# Patient Record
Sex: FEMALE | Race: WHITE | Employment: OTHER | URBAN - METROPOLITAN AREA
[De-identification: names, ages, dates, MRNs, and addresses within clinical notes are randomized per-mention and may not be internally consistent; named-entity substitution may affect disease eponyms.]

---

## 2020-07-24 ENCOUNTER — HOSPITAL ENCOUNTER (OUTPATIENT)
Dept: ULTRASOUND IMAGING | Age: 85
Discharge: HOME | End: 2020-07-24
Attending: ORTHOPAEDIC SURGERY
Payer: MEDICARE

## 2020-07-24 DIAGNOSIS — M79.662 PAIN AND SWELLING OF LEFT LOWER LEG: ICD-10-CM

## 2020-07-24 DIAGNOSIS — M79.89 PAIN AND SWELLING OF LEFT LOWER LEG: ICD-10-CM

## 2020-07-24 PROCEDURE — 93971 EXTREMITY STUDY: CPT

## 2022-09-19 ENCOUNTER — HOSPITAL ENCOUNTER (OUTPATIENT)
Dept: GENERAL RADIOLOGY | Age: 87
Discharge: HOME | End: 2022-09-19
Payer: MEDICARE

## 2022-09-19 ENCOUNTER — TRANSCRIBE ORDER (OUTPATIENT)
Dept: GENERAL RADIOLOGY | Age: 87
End: 2022-09-19
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
## 2022-09-19 DIAGNOSIS — R07.89 CHEST WALL PAIN: ICD-10-CM
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
## 2022-09-19 PROCEDURE — 71111 X-RAY EXAM RIBS/CHEST4/> VWS: CPT

## 2022-11-01 ENCOUNTER — OFFICE VISIT (OUTPATIENT)
Dept: ENT CLINIC | Age: 87
End: 2022-11-01
Payer: MEDICARE

## 2022-11-01 DIAGNOSIS — H90.3 SENSORINEURAL HEARING LOSS (SNHL) OF BOTH EARS: Primary | ICD-10-CM

## 2022-11-01 PROCEDURE — 92557 COMPREHENSIVE HEARING TEST: CPT | Performed by: AUDIOLOGIST

## 2022-11-01 PROCEDURE — 92567 TYMPANOMETRY: CPT | Performed by: AUDIOLOGIST

## 2022-11-01 NOTE — LETTER
11/1/2022    Patient: Avel Mora   YOB: 1928   Date of Visit: 11/1/2022     Meredith Collins MD  [address] [place] [address]  Via Fax: [phone]    Dear Meredith Collins MD,      Thank you for referring Ms. Viji Hein to [facility]SE [facility] [address], THROAT AND ALLERGY CARE for evaluation. My notes for this consultation are attached. If you have questions, please do not hesitate to call me. I look forward to following your patient along with you.       Sincerely,    Tennille Perea, AuD

## 2022-11-01 NOTE — PROGRESS NOTES
Patient name: Nikos Cates   : 1928   MRN: [id number]   Appointment type: Audiogram    Patient is a very pleasant 80 y.o. female  referred by Dr. Beau Holt for an audiological evaluation. Patient reports hearing loss and tinnitus. There is no complaint of dizziness or vertigo. There is no family history of hearing loss. Patient has no reported history of noise exposure or head trauma and denies any ear pain or ear fullness/pressure. She is accompanied to today's appointment by her daughter. Per daughter, patient's last audiogram is dated 2019 through [address] [place] [address] (not available today). She reports that patient was fit with hearing aids but wore them into the shower and damaged them. She also reports a diagnosis of dementia. Otoscopy: canals clear, tympanic membrane intact bilaterally  Tympanometry:   RE: Type C and negative pressure    LE: Type C and negative pressure    SRT:   RE Speech Reception Threshold (SRT) was obtained at 55 dBHL   LE Speech Reception Threshold (SRT) was obtained at 60 dBHL    WRS (NU-6):   RE Poorin quiet when words were presented at 85 dBHL. LE Poorin quiet when words were presented at 85 dBHL. Pure tone audiometry:   RE: Moderate sloping to severe sensorineural hearing loss    LE: Moderate-severe sloping to severe sensorineural hearing loss    Explained results to patient and daughter. Patient's hearing loss is significant and patient would be a candidate for either hearing aids or referral for cochlear implant evaluation. Did discuss both options and daughter is hesitant to pursue CI evaluation due to risks of anesthesia; patient's age, etc. Also discussed that dementia diagnosis may present some challenges if she were to pursue CI evaluation. Did also discuss realistic expectations for hearing aids given patient's hearing loss and word understanding; patient and daughter would like to proceed with hearing aid evaluation.      Plan:   Hearing aid evaluation upon request.  Repeat audiogram upon request.    Rebecca Leo   Doctor of Audiology

## 2022-11-22 ENCOUNTER — OFFICE VISIT (OUTPATIENT)
Dept: ENT CLINIC | Age: 87
End: 2022-11-22
Payer: MEDICARE

## 2022-11-22 DIAGNOSIS — H90.3 SENSORINEURAL HEARING LOSS (SNHL) OF BOTH EARS: Primary | ICD-10-CM

## 2022-11-22 PROCEDURE — V5010 ASSESSMENT FOR HEARING AID: HCPCS | Performed by: AUDIOLOGIST

## 2022-11-22 PROCEDURE — V5275 EAR IMPRESSION: HCPCS | Performed by: AUDIOLOGIST

## 2022-11-22 NOTE — PROGRESS NOTES
Pt. Name: Shy Bazzi   : 1928  MRN: [id number]    Appointment type: Hearing Aid Evaluation  Patient is a very pleasant 80y.o. year old female seen today for a hearing aid evaluation. She is accompanied to today's appointment by her daughter. Patient's last hearing test was 2022 which shows a moderate to severe sensorineural hearing loss in the right ear and a moderate-severe to severe sensorineural hearing loss in the left ear. Patient reports difficulty understanding speech, especially in the presence of background noise. Patient's medical history is significant for dementia. Reviewed audiogram with patient and discussed hearing aid candidacy. Went over hearing aid style, technology and cost. Recommended trial with hearing aids. Patient agreed. Will order [employer] Moment 110 BTE-R hearing aids for patient. After informed verbal consent was obtained, bilateral earmold impressions were taken today without incident. Will send earmold impressions to [facility] as [employer] does not make earmolds to accommodate traditional tubing. [facility] can be found under Media. Plan: Patient to RTC for HAF.        Rebecca Nguyen  Doctor of Audiology

## 2022-12-23 ENCOUNTER — TELEPHONE (OUTPATIENT)
Dept: ENT CLINIC | Age: 87
End: 2022-12-23

## 2022-12-27 ENCOUNTER — OFFICE VISIT (OUTPATIENT)
Dept: ENT CLINIC | Age: 87
End: 2022-12-27
Payer: MEDICARE

## 2022-12-27 DIAGNOSIS — H90.3 SENSORINEURAL HEARING LOSS (SNHL) OF BOTH EARS: Primary | ICD-10-CM

## 2022-12-27 PROCEDURE — V5299 HEARING SERVICE: HCPCS | Performed by: AUDIOLOGIST

## 2022-12-27 NOTE — PROGRESS NOTES
Pt. Name: Abhay Fortune   : 1928   MRN: [id number]     Appointment type: Hearing Aid Fitting  Patient was seen today for a binaural hearing aid fitting. Had previously ordered [employer] Moment 110 BTE-R hearing aids coupled to custom skeleton acrylic earmolds. Reviewed care and use of hearing aids and  today with patient and daughter; however patient had significant difficulty with removing hearing aids. Recommended re-ordering earmolds with removal string handle to facilitate easier removal. Patient and daughter agreed. Will order new earmolds and return current molds for credit when they arrive. Will keep all hearing aid components in office until that time. No payment collected today. Recommended we schedule a hearing aid fitting in 2 weeks and plan to spend majority of that appointment practicing insertion/removal of hearing aids. Plan: Pt. to RTC in 2 weeks to be fit with hearing aids with removal handles on earmolds.      Rebecca Lozada   Doctor of Audiology

## 2023-01-10 ENCOUNTER — OFFICE VISIT (OUTPATIENT)
Dept: ENT CLINIC | Age: 88
End: 2023-01-10
Payer: MEDICARE

## 2023-01-10 DIAGNOSIS — H90.3 SENSORINEURAL HEARING LOSS (SNHL) OF BOTH EARS: Primary | ICD-10-CM

## 2023-01-10 PROCEDURE — V5264 EAR MOLD/INSERT: HCPCS | Performed by: AUDIOLOGIST

## 2023-01-10 PROCEDURE — V5261 HEARING AID, DIGIT, BIN, BTE: HCPCS | Performed by: AUDIOLOGIST

## 2023-01-10 NOTE — PROGRESS NOTES
Pt. Name: Fani Edmond   : 1928   MRN: [id number]     Appointment type: Hearing Aid Fitting  Patient was seen today for a binaural hearing aid fitting and was fit with [employer] Moment 110 BTE-R hearing aids coupled to custom acrylic skeleton earmolds. Patient was initially seen for hearing aid fitting on 2022; however absence of removal handle on earmolds made insertion/removal significantly more difficult for patient. Offered to have earmolds remade with removal handles and have hearing aid fitting scheduled when new molds came in; patient and daughter agreed. Hearing aids are a good fit. Programmed to adaptation level 2. Patient did report that she was hearing much louder than before; reviewed that there would likely need to be an adjustment period with listening with the hearing aids and re-learning how to process sound; daughter and patient indicated understanding. Hearing aids have one automatic program and an automatic telephone program. Patient was instructed on use and care of hearing aids as well as given a hearing aid instruction manual. Patient practiced placing hearing aids on  today. R and L indicator labels were added to  wells to help in differentiating hearing aids; also reviewed color coding. Patient was able to remove and insert hearing aids successfully today in office. Did osito \"handle\" of skeleton mold to help patient orient and hold the mold correctly for insertion. Advised using mirror once earmold was partially inserted in order to check that all parts of earmold were settled correctly in ear. Realistic expectations of hearing aid performance were discussed. Hearing aid warranty was explained. Patient demonstrates understanding of what was discussed during today's appointment. Reviewed information on Hearing Aid Purchase Agreement with patient. Patient signed agreement form and paid half of total for hearing aids.  Hearing Aid Purchase Agreement can be found under Media. Hearing aid information:   : [employer]  Model: Moment 110 BTE-R  S/N R: [id number]  S/N L: [id number]  Color: Sammy Lofton  Battery size: rechargeable ( S/N: [id number])  Earmold S/N R: [id number]  Earmold S/N L: [id number] (repair warranty: 4-3-2023)  Warranty end: 12-    End of Trial: 2-  Amount Due: $720.00    Plan: Pt. to RTC in 2 weeks for HAC.     Rebecca Graham   Doctor of Audiology

## 2023-02-02 ENCOUNTER — OFFICE VISIT (OUTPATIENT)
Dept: ENT CLINIC | Age: 88
End: 2023-02-02
Payer: MEDICARE

## 2023-02-02 DIAGNOSIS — H90.3 SENSORINEURAL HEARING LOSS (SNHL) OF BOTH EARS: Primary | ICD-10-CM

## 2023-02-02 PROCEDURE — V5299 HEARING SERVICE: HCPCS | Performed by: AUDIOLOGIST

## 2023-02-02 NOTE — PROGRESS NOTES
Pt. Name: Mark Nuñez   : 1928   MRN: [id number]     Appointment type: Hearing Aid Check    : [employer]   Model: Moment 110-R  S/N R: [id number]   S/N L: [id number]  Repair warranty: 2024  L/D warranty: 2024    Earmold information Ros Amor)  S/N R: [id number]   S/N L: [id number]    Patient in for a progress check with [employer] Moment 110 BTE-R hearing aids coupled to custom skeleton acrylic earmolds. Patient was initially fit with these devices on 1- after earmold remake to include removal handle for easier use. Patient is accompanied to today's appointment by her daughter, who reports that patient is hearing better when the hearing aids are in, and she is successfully removing them and using the . However she is still having trouble inserting hearing aids correctly. Daughter reports that usually she is responsible for putting them in every morning as patient is not able. Patient wonders whether a different style may be easier to use. Data logging revealed average daily wear time of 8 hours 8 minutes. Increased to acclimatization level 3 and patient reported hearing better. Did discuss option to swap to a full-shell ITE hearing aid as patient is still within her trial period; patient is interested in this option. Did state that I'm not able to guarantee better fit or easier insertion with a full-shell custom aid but willing to try; patient and daughter indicated understanding. Will order full-shell custom ITE-R aids from [employer] using earmold impressions on file. Patient is scheduled for hearing aid fitting with new tech on 2023. At that time, will plan to swap patient's current BTE aids but keep them in the office - can discuss extending [address] period with [employer] if needed. Plan: Patient to [facility] 2023 for hearing aid fitting with [employer] custom ITE aids.      Rebecca Rondon   Doctor of Audiology

## 2023-02-14 ENCOUNTER — OFFICE VISIT (OUTPATIENT)
Dept: ENT CLINIC | Age: 88
End: 2023-02-14
Payer: MEDICARE

## 2023-02-14 DIAGNOSIS — H90.3 SENSORINEURAL HEARING LOSS (SNHL) OF BOTH EARS: Primary | ICD-10-CM

## 2023-02-14 PROCEDURE — V5299 HEARING SERVICE: HCPCS | Performed by: AUDIOLOGIST

## 2023-02-14 NOTE — PROGRESS NOTES
Pt. Name: Gaby Burgess   : 1928   MRN: [id number]     Appointment type: Hearing Aid Fitting (Trial Period - Exchange)  Patient was seen today for a binaural hearing aid fitting and was fit with [employer] AI 1000 full-shell ITE-R hearing aids. Patient had previously been fit on 1- with [employer] Moment 110 BTE-R hearing aids coupled to custom acrylic earmolds but had reported that they were difficult to insert; custom devices were recommended as an exchange during the trial period. Patient agreed. Hearing aids are a good fit. Programmed to adaptation level 2. Hearing aids have one automatic program and an automatic telephone program. Demonstrated indicator tones (i.e. low battery, phone). Patient was instructed on use and care of hearing aids as well as given a hearing aid instruction manual. Patient practiced using  and inserting/removing hearing aids successfully. Patient reports that the left hearing aid is painful to insert; after discussion with [facility] will try changing left aid style only to a canal-style custom device to avoid putting further pressure on the alejandro. Explained to patient and daughter that I will request a remake of the left device so the patient will only be leaving today with a hearing aid for the right ear. Realistic expectations of hearing aid performance were discussed. Hearing aid warranty was explained. Patient demonstrates understanding of what was discussed during today's appointment. Reviewed information on Hearing Aid Purchase Agreement with patient. Hearing Aid Purchase Agreement can be found under Media.     Hearing aid information:   : [employer]  Model: [employer] AI 1000 ITE-R (full shell)  S/N R: [id number]  S/N L: [id number] - sending back for remake to half-shell/ITC style  Color: pink faceplate/color-coded canals  Battery size: rechargeable ( S/N: [id number])  Warranty end: 3-    End of Trial: 3-  Amount Due: $720.00    Plan: Pt. to RTC in 2 weeks for [facility] pascale left hearing aid.      Verta Kayser, AuD   Doctor of Audiology

## 2023-03-08 ENCOUNTER — OFFICE VISIT (OUTPATIENT)
Dept: ENT CLINIC | Age: 88
End: 2023-03-08
Payer: MEDICARE

## 2023-03-08 DIAGNOSIS — H90.3 SENSORINEURAL HEARING LOSS (SNHL) OF BOTH EARS: Primary | ICD-10-CM

## 2023-03-08 PROCEDURE — V5299 HEARING SERVICE: HCPCS | Performed by: AUDIOLOGIST

## 2023-03-08 NOTE — PROGRESS NOTES
Pt. Name: Mauricio Fortune   : 1928   MRN: [id number]     Appointment type: Hearing Aid Check (Trial Period)    : Audie Garcia: Thier[employer] AI 1000 ITE-R  S/N R: [id number]   S/N L: [id number]  Repair warranty: 3-  L/D warranty: 3-    Patient in for a progress check with [employer] AI 1000 ITE-R hearing aids. Patient was initially fit on 2023. This was an exchange from her previous pair of [employer] Trovali BTE-R hearing aids coupled to custom molds (fit on 1-). Patient had reported difficulty inserting and positioning hearing aids; exchange with custom in-the-ear aids was recommended. At previous appointment for fitting with custom aids, patient had discomfort and difficulty with placing left hearing aid due to size and shape of aid. Left aid was sent in for remake from ITE to half-shell style, to avoid creating a pressure sore and to facilitate easier insertion. Patient reports she is very pleased with the right hearing aid and has had no issues with positioning it in her ear, or using the . Patient's daughter also reports that she is doing well with her right aid. Data logging revealed average daily wear time of 8 hours for the right hearing aid. Connected remade left hearing aid in software and paired hearing aids together. User button is disabled. Patient reported good sound quality in both ears and a comfortable fit in the left ear. Patient was able to successfully insert and remove left hearing aid several times today, and place aid correctly on the . Plan: Patient to RTC in 2 weeks for hearing aid check if needed. If patient is doing well and there are no concerns, can plan to push hearing aid check out to 3 months and return [employer] BTEs for credit.     End of Trial: 3-  Amount Due: $720.00    Rebecca Dietrich   Doctor of Audiology

## 2023-03-21 ENCOUNTER — TELEPHONE (OUTPATIENT)
Dept: ENT CLINIC | Age: 88
End: 2023-03-21

## 2023-03-21 NOTE — TELEPHONE ENCOUNTER
I spoke with pt's daughter and I rescheduled her [address] from tomorrow to 3 months from now and I informed her of the $720.00 due at the next appointment and she stated she was not sure that the amount was still owed because she stated that the pt paid $720.00 on 01/10 and then paid $670.00 on 02/17

## 2023-04-05 ENCOUNTER — HOSPITAL ENCOUNTER (OUTPATIENT)
Dept: GENERAL RADIOLOGY | Age: 88
End: 2023-04-05
Payer: MEDICARE

## 2023-04-05 ENCOUNTER — TRANSCRIBE ORDER (OUTPATIENT)
Dept: GENERAL RADIOLOGY | Age: 88
End: 2023-04-05

## 2023-04-05 PROCEDURE — 71046 X-RAY EXAM CHEST 2 VIEWS: CPT

## 2023-04-13 ENCOUNTER — HOSPITAL ENCOUNTER (OUTPATIENT)
Dept: LAB | Age: 88
Discharge: HOME | End: 2023-04-13
Payer: MEDICARE

## 2023-04-13 DIAGNOSIS — J44.9 COPD WITH PNEUMONIA (HCC): ICD-10-CM

## 2023-04-13 DIAGNOSIS — J18.9 COPD WITH PNEUMONIA (HCC): ICD-10-CM

## 2023-04-13 PROCEDURE — 86480 TB TEST CELL IMMUN MEASURE: CPT

## 2023-04-13 PROCEDURE — 36415 COLL VENOUS BLD VENIPUNCTURE: CPT

## 2023-04-19 ENCOUNTER — HOSPITAL ENCOUNTER (OUTPATIENT)
Dept: CT IMAGING | Age: 88
Discharge: HOME | End: 2023-04-19
Payer: MEDICARE

## 2023-04-19 DIAGNOSIS — J47.9 BRONCHIECTASIS (HCC): ICD-10-CM

## 2023-04-19 PROCEDURE — 71250 CT THORAX DX C-: CPT

## 2023-04-27 ENCOUNTER — HOSPITAL ENCOUNTER (OUTPATIENT)
Dept: LAB | Age: 88
Discharge: HOME | End: 2023-04-27
Payer: MEDICARE

## 2023-04-27 ENCOUNTER — TRANSCRIBE ORDER (OUTPATIENT)
Dept: REGISTRATION | Age: 88
End: 2023-04-27

## 2023-04-27 DIAGNOSIS — J44.9 COPD (CHRONIC OBSTRUCTIVE PULMONARY DISEASE) (HCC): Primary | ICD-10-CM

## 2023-04-27 DIAGNOSIS — J44.9 COPD (CHRONIC OBSTRUCTIVE PULMONARY DISEASE) (HCC): ICD-10-CM

## 2023-04-27 PROCEDURE — 36415 COLL VENOUS BLD VENIPUNCTURE: CPT

## 2023-04-27 PROCEDURE — 86480 TB TEST CELL IMMUN MEASURE: CPT

## 2023-04-28 ENCOUNTER — HOSPITAL ENCOUNTER (EMERGENCY)
Age: 88
Discharge: HOME | End: 2023-04-28
Attending: EMERGENCY MEDICINE
Payer: MEDICARE

## 2023-04-28 ENCOUNTER — APPOINTMENT (OUTPATIENT)
Dept: CT IMAGING | Age: 88
End: 2023-04-28
Attending: EMERGENCY MEDICINE
Payer: MEDICARE

## 2023-04-28 VITALS
DIASTOLIC BLOOD PRESSURE: 78 MMHG | SYSTOLIC BLOOD PRESSURE: 173 MMHG | OXYGEN SATURATION: 96 % | HEART RATE: 68 BPM | RESPIRATION RATE: 18 BRPM | TEMPERATURE: 98 F
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
## 2023-04-28 DIAGNOSIS — S00.83XA TRAUMATIC HEMATOMA OF FOREHEAD, INITIAL ENCOUNTER: ICD-10-CM

## 2023-04-28 DIAGNOSIS — S22.060A CLOSED WEDGE COMPRESSION FRACTURE OF T8 VERTEBRA, INITIAL ENCOUNTER (HCC): Primary | ICD-10-CM

## 2023-04-28 PROCEDURE — 71250 CT THORAX DX C-: CPT

## 2023-04-28 PROCEDURE — 70450 CT HEAD/BRAIN W/O DYE: CPT

## 2023-04-28 PROCEDURE — 72125 CT NECK SPINE W/O DYE: CPT

## 2023-04-28 PROCEDURE — 99284 EMERGENCY DEPT VISIT MOD MDM: CPT

## 2023-04-28 NOTE — ED PROVIDER NOTES
Fall       89y F here s/p fall. Occurred about 3 hours prior to my evaluation today. Was home alone but when daughter and son in law got home pt was on the floor. She does have some dementia but was able to tell them she tripped on her walker and did not have loss of consciousness. She does have a hematoma to the forehead. Also complains of some R sided rib pain and thoracic back pain. No vomiting. Has been acting normally since that time.     Past Medical History:   Diagnosis Date    Hypercholesterolemia     Hypertension     Macular degeneration        Past Surgical History:   Procedure Laterality Date    CARDIAC SURG PROCEDURE UNLIST      cabg 2006    HX CHOLECYSTECTOMY  1995    HX HAMMER TOE REPAIR  2000    [address]    HX ORTHOPAEDIC  1999    plate/7 screws in collar bone    HX OTHER SURGICAL  1995 & 1996    heel spur         Family History:   Problem Relation Age of Onset    Heart Disease Sister     Heart Disease Son     Cancer Son     Cancer Daughter        Social History     Socioeconomic History    Marital status:      Spouse name: Not on file    Number of children: Not on file    Years of education: Not on file    Highest education level: Not on file   Occupational History    Not on file   Tobacco Use    Smoking status: Former     Years: 22.00     Types: Cigarettes    Smokeless tobacco: Never   Substance and Sexual Activity    Alcohol use: No    Drug use: Not on file    Sexual activity: Not on file   Other Topics Concern    Not on file   Social History Narrative    Not on file     Social Determinants of Health     Financial Resource Strain: Not on file   Food Insecurity: Not on file   Transportation Needs: Not on file   Physical Activity: Not on file   Stress: Not on file   Social Connections: Not on file   Intimate Partner Violence: Not on file   Housing Stability: Not on file         ALLERGIES: Bactrim [sulfamethoprim ds], Flagyl [metronidazole], Keftab, Macrodantin [nitrofurantoin macrocrystalline], Meclizine, Noroxin [norfloxacin], Trimethoprim, and Ultram [tramadol]    Review of Systems  Review of Systems   Constitutional: (-) weight loss. HEENT: (-) stiff neck   Eyes: (-) discharge. Respiratory: (-) cough. Cardiovascular: (-) syncope. Gastrointestinal: (-) blood in stool. Genitourinary: (-) hematuria. Musculoskeletal: (-) myalgias. Neurological: (-) seizure. Skin: (-) petechiae  Lymph/Immunologic: (-) enlarged lymph nodes  All other systems reviewed and are negative. Vitals:    04/28/23 1524   BP: 126/85   Pulse: 75   Resp: 18   Temp: 98.1 °F (36.7 °C)   SpO2: 94%            Physical Exam   Nursing note and vitals reviewed. Constitutional: oriented to person, place, and time. appears well-developed and well-nourished. No distress. Head: Normocephalic. Sclera anicteric. 2cm hematoma to the forehead. Nose: No rhinorrhea  Mouth/Throat: Oropharynx is clear and moist. Pharynx normal  Eyes: Conjunctivae are normal. Pupils are equal, round, and reactive to light. Right eye exhibits no discharge. Left eye exhibits no discharge. Neck: Painless normal range of motion. Neck supple. No LAD. Cardiovascular: Normal rate, regular rhythm, normal heart sounds and intact distal pulses. Exam reveals no gallop and no friction rub. No murmur heard. Pulmonary/Chest:  No respiratory distress. No wheezes. No rales. No rhonchi. No increased work of breathing. No accessory muscle use. Good air exchange throughout. Tender around the costal margin of the R lower ribs. No crepitance. Abdominal: soft, non-tender, no rebound or guarding. No hepatosplenomegaly. Normal bowel sounds throughout. Back: no tenderness to palpation, no deformities, no CVA tenderness  Extremities/Musculoskeletal: Normal range of motion. no tenderness. No edema. Distal extremities are neurovasc intact. Lymphadenopathy:   No adenopathy. Neurological:  Alert and oriented to person, place, and time.  Coordination normal. CN 2-12 intact. Motor and sensory function intact. Skin: Skin is warm and dry. No rash noted. No pallor. Medical Decision Making  Amount and/or Complexity of Data Reviewed  Radiology: ordered. 80y F here s/p fall. Will check CT head and c-spine as well as CXR and thoracic spine imaging. Procedures    4:54 PM  Compression fx at T8. Pain will controlled and has been ambulatory. Discussed with ortho - will dc and have her follow-up next week to determine further treatment plan. Daughter is comfortable with the plan.

## 2023-04-28 NOTE — DISCHARGE INSTRUCTIONS
Use tylenol for pain. Avoid heavy lifting, bending, and twisting. Take extra care when going from a seated to standing position and vice versa. Please call orthopaedics to schedule an appointment for next week.

## 2023-04-28 NOTE — ED TRIAGE NOTES
Patient arrived with daughter. Patient lives with daughter. Patient had unwitnessed mechanical GLF and was on floor for at most of 45 minutes while daughter was running errands. Patient denies LOC. Patient hit her head - has a large bruise above right eye. Denies blood thinners.  Patient c/o right elbow pain and right sided rib pain

## 2023-05-02 LAB
M TB IFN-G BLD-IMP: NEGATIVE
M TB IFN-G CD4+ T-CELLS BLD-ACNC: 0.05 IU/ML
M TBIFN-G CD4+ CD8+T-CELLS BLD-ACNC: 0.06 IU/ML
QUANTIFERON CRITERIA, QFI1T: NORMAL
QUANTIFERON INCUBATION, QF1T: NORMAL
QUANTIFERON MITOGEN VALUE: >10 IU/ML
QUANTIFERON NIL VALUE: 0.06 IU/ML

## 2023-05-11 ENCOUNTER — APPOINTMENT (OUTPATIENT)
Facility: HOSPITAL | Age: 88
End: 2023-05-11
Payer: MEDICARE

## 2023-05-11 ENCOUNTER — HOSPITAL ENCOUNTER (EMERGENCY)
Facility: HOSPITAL | Age: 88
Discharge: HOME | End: 2023-05-11
Attending: STUDENT IN AN ORGANIZED HEALTH CARE EDUCATION/TRAINING PROGRAM
Payer: MEDICARE

## 2023-05-11 VITALS
RESPIRATION RATE: 16 BRPM | OXYGEN SATURATION: 97 % | DIASTOLIC BLOOD PRESSURE: 117 MMHG | TEMPERATURE: 97.8 F | BODY MASS INDEX: 25.98 KG/M2 | SYSTOLIC BLOOD PRESSURE: 133 MMHG | HEART RATE: 80 BPM | HEIGHT: 62 IN | WEIGHT: 141.2 LBS

## 2023-05-11 DIAGNOSIS — S22.060D CLOSED WEDGE COMPRESSION FRACTURE OF T8 VERTEBRA WITH ROUTINE HEALING, SUBSEQUENT ENCOUNTER: ICD-10-CM

## 2023-05-11 DIAGNOSIS — S20.211A CONTUSION OF RIGHT CHEST WALL, INITIAL ENCOUNTER: ICD-10-CM

## 2023-05-11 DIAGNOSIS — S80.01XA CONTUSION OF RIGHT KNEE, INITIAL ENCOUNTER: ICD-10-CM
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
## 2023-05-11 DIAGNOSIS — S41.112A SKIN TEAR OF LEFT UPPER ARM WITHOUT COMPLICATION, INITIAL ENCOUNTER: ICD-10-CM

## 2023-05-11 LAB
ANION GAP SERPL CALC-SCNC: 10 MMOL/L (ref 5–15)
BUN SERPL-MCNC: 20 MG/DL (ref 8–23)
BUN/CREAT SERPL: 28 (ref 12–20)
CALCIUM SERPL-MCNC: 9.2 MG/DL (ref 8.2–9.6)
CHLORIDE SERPL-SCNC: 108 MMOL/L (ref 98–107)
CO2 SERPL-SCNC: 22 MMOL/L (ref 22–29)
COMMENT:: NORMAL
CREAT SERPL-MCNC: 0.72 MG/DL (ref 0.5–0.9)
GLUCOSE SERPL-MCNC: 98 MG/DL (ref 65–100)
POTASSIUM SERPL-SCNC: 4.1 MMOL/L (ref 3.5–5.1)
SODIUM SERPL-SCNC: 140 MMOL/L (ref 136–145)
SPECIMEN HOLD: NORMAL

## 2023-05-11 PROCEDURE — 73502 X-RAY EXAM HIP UNI 2-3 VIEWS: CPT

## 2023-05-11 PROCEDURE — 74176 CT ABD & PELVIS W/O CONTRAST: CPT

## 2023-05-11 PROCEDURE — 99284 EMERGENCY DEPT VISIT MOD MDM: CPT

## 2023-05-11 PROCEDURE — 36415 COLL VENOUS BLD VENIPUNCTURE: CPT

## 2023-05-11 PROCEDURE — 80048 BASIC METABOLIC PNL TOTAL CA: CPT

## 2023-05-11 PROCEDURE — 73060 X-RAY EXAM OF HUMERUS: CPT

## 2023-05-11 PROCEDURE — 71250 CT THORAX DX C-: CPT

## 2023-05-11 ASSESSMENT — ENCOUNTER SYMPTOMS
NAUSEA: 0
SHORTNESS OF BREATH: 0
DIARRHEA: 0
EYE REDNESS: 0
ABDOMINAL PAIN: 1
EYE PAIN: 0
VOMITING: 0
COUGH: 0

## 2023-05-11 ASSESSMENT — LIFESTYLE VARIABLES
HOW MANY STANDARD DRINKS CONTAINING ALCOHOL DO YOU HAVE ON A TYPICAL DAY: PATIENT DOES NOT DRINK
HOW OFTEN DO YOU HAVE A DRINK CONTAINING ALCOHOL: NEVER

## 2023-05-11 ASSESSMENT — PAIN SCALES - GENERAL: PAINLEVEL_OUTOF10: 0

## 2023-05-11 ASSESSMENT — PAIN - FUNCTIONAL ASSESSMENT: PAIN_FUNCTIONAL_ASSESSMENT: 0-10

## 2023-05-11 NOTE — ED PROVIDER NOTES
All EKG's are interpreted by the Emergency Department Physician who either signs or Co-signs this chart in the absence of a cardiologist.        RADIOLOGY:   Interpretation per the Radiologist below, if available at the time of this note:    CT CHEST [address]   Final Result      1. T8 partial compression fracture is acute or subacute, new since 3 weeks ago. 2. Chronic interstitial lung disease. No acute pulmonary process or pulmonary   contusion. 3. Nonobstructing right nephrolithiasis. Moderate diverticulosis. Other   incidental findings in the abdomen and pelvis are described above. 4. Nonacute left femoral neck insufficiency fracture. CT ABDOMEN PELVIS WO CONTRAST Additional Contrast? None   Final Result      1. T8 partial compression fracture is acute or subacute, new since 3 weeks ago. 2. Chronic interstitial lung disease. No acute pulmonary process or pulmonary   contusion. 3. Nonobstructing right nephrolithiasis. Moderate diverticulosis. Other   incidental findings in the abdomen and pelvis are described above. 4. Nonacute left femoral neck insufficiency fracture. XR HUMERUS LEFT (MIN 2 VIEWS)   Final Result   No acute abnormality. XR HIP 2-3 VW W PELVIS RIGHT   Final Result   No acute bony abnormality. LABS:  Labs Reviewed   BASIC METABOLIC PANEL - Abnormal; Notable for the following components:       Result Value    Chloride 108 (*)     Bun/Cre Ratio 28 (*)     All other components within normal limits   EXTRA TUBES HOLD       All other labs were within normal range or not returned as of this dictation. COURSE/REASSESSMENT            CONSULTS:  None    PROCEDURES:  Unless otherwise noted below, none     Procedures      DIFFERENTIAL DIAGNOSIS/MDM:   Medical Decision Making  Patient is a 80-year-old female who presents today secondary to a ground-level fall that occurred just prior to arrival.  She is not on blood thinners.   Denies head neck or back

## 2023-05-11 NOTE — ED TRIAGE NOTES
PT presents via POV with daughter for c/o fall. Daughter states pt had tripped while making her bed and fell against her nightstand. Denies hitting her head or LOC. Pt has skin tear on left arm, dressed by EMS.

## 2023-08-21 ENCOUNTER — HOSPITAL ENCOUNTER (OUTPATIENT)
Facility: HOSPITAL | Age: 88
Discharge: HOME | End: 2023-08-24
Payer: MEDICARE

## 2023-08-21 DIAGNOSIS — J47.9 BRONCHIECTASIS WITHOUT COMPLICATION (HCC): ICD-10-CM

## 2023-08-21 PROCEDURE — 71046 X-RAY EXAM CHEST 2 VIEWS: CPT

## 2023-08-30 ENCOUNTER — TRANSCRIBE ORDERS (OUTPATIENT)
Facility: HOSPITAL | Age: 88
End: 2023-08-30

## 2023-08-30 DIAGNOSIS — R91.8 ABNORMAL LUNG FIELD: Primary | ICD-10-CM

## 2023-09-01 ENCOUNTER — HOSPITAL ENCOUNTER (OUTPATIENT)
Facility: HOSPITAL | Age: 88
End: 2023-09-01
Attending: INTERNAL MEDICINE
Payer: MEDICARE

## 2023-09-01 DIAGNOSIS — R91.8 ABNORMAL LUNG FIELD: ICD-10-CM

## 2023-09-01 LAB — CREAT BLD-MCNC: 0.7 MG/DL (ref 0.6–1.3)

## 2023-09-01 PROCEDURE — 6360000004 HC RX CONTRAST MEDICATION: Performed by: INTERNAL MEDICINE

## 2023-09-01 PROCEDURE — 71260 CT THORAX DX C+: CPT

## 2023-09-01 PROCEDURE — 82565 ASSAY OF CREATININE: CPT

## 2023-09-01 RX ADMIN — IOPAMIDOL 100 ML: 755 INJECTION, SOLUTION INTRAVENOUS at 10:21

## 2023-09-19 ENCOUNTER — HOSPITAL ENCOUNTER (EMERGENCY)
Facility: HOSPITAL | Age: 88
Discharge: HOME | End: 2023-09-19
Attending: EMERGENCY MEDICINE
Payer: MEDICARE

## 2023-09-19 ENCOUNTER — APPOINTMENT (OUTPATIENT)
Facility: HOSPITAL | Age: 88
End: 2023-09-19
Payer: MEDICARE

## 2023-09-19 VITALS
WEIGHT: 130 LBS | OXYGEN SATURATION: 97 % | TEMPERATURE: 97.3 F | HEART RATE: 84 BPM | BODY MASS INDEX: 23.92 KG/M2 | DIASTOLIC BLOOD PRESSURE: 70 MMHG | HEIGHT: 62 IN | RESPIRATION RATE: 18 BRPM | SYSTOLIC BLOOD PRESSURE: 166 MMHG

## 2023-09-19 DIAGNOSIS — R06.02 SHORTNESS OF BREATH: Primary | ICD-10-CM

## 2023-09-19 LAB
ALBUMIN SERPL-MCNC: 2.7 G/DL (ref 3.5–5)
ALBUMIN/GLOB SERPL: 0.9 (ref 1.1–2.2)
ALP SERPL-CCNC: 57 U/L (ref 45–117)
ALT SERPL-CCNC: 26 U/L (ref 12–78)
ANION GAP SERPL CALC-SCNC: 9 MMOL/L (ref 5–15)
APPEARANCE UR: CLEAR
AST SERPL W P-5'-P-CCNC: 22 U/L (ref 15–37)
BACTERIA URNS QL MICRO: NEGATIVE /HPF
BASOPHILS # BLD: 0 K/UL (ref 0–0.1)
BASOPHILS NFR BLD: 0 % (ref 0–1)
BILIRUB SERPL-MCNC: 0.9 MG/DL (ref 0.2–1)
BILIRUB UR QL: NEGATIVE
BNP SERPL-MCNC: 2683 PG/ML
BUN SERPL-MCNC: 18 MG/DL (ref 6–20)
BUN/CREAT SERPL: 23 (ref 12–20)
CA-I BLD-MCNC: 8.5 MG/DL (ref 8.5–10.1)
CHLORIDE SERPL-SCNC: 105 MMOL/L (ref 97–108)
CO2 SERPL-SCNC: 24 MMOL/L (ref 21–32)
COLOR UR: ABNORMAL
CREAT SERPL-MCNC: 0.78 MG/DL (ref 0.55–1.02)
DIFFERENTIAL METHOD BLD: ABNORMAL
EOSINOPHIL # BLD: 0.2 K/UL (ref 0–0.4)
EOSINOPHIL NFR BLD: 3 % (ref 0–7)
EPITH CASTS URNS QL MICRO: ABNORMAL /LPF
ERYTHROCYTE [DISTWIDTH] IN BLOOD BY AUTOMATED COUNT: 13.6 % (ref 11.5–14.5)
GLOBULIN SER CALC-MCNC: 3 G/DL (ref 2–4)
GLUCOSE SERPL-MCNC: 98 MG/DL (ref 65–100)
GLUCOSE UR STRIP.AUTO-MCNC: NEGATIVE MG/DL
HCT VFR BLD AUTO: 35.6 % (ref 35–47)
HGB BLD-MCNC: 11.1 G/DL (ref 11.5–16)
HGB UR QL STRIP: ABNORMAL
IMM GRANULOCYTES # BLD AUTO: 0.1 K/UL (ref 0–0.04)
IMM GRANULOCYTES NFR BLD AUTO: 1 % (ref 0–0.5)
KETONES UR QL STRIP.AUTO: 20 MG/DL
LEUKOCYTE ESTERASE UR QL STRIP.AUTO: NEGATIVE
LYMPHOCYTES # BLD: 1.9 K/UL (ref 0.8–3.5)
LYMPHOCYTES NFR BLD: 27 % (ref 12–49)
MCH RBC QN AUTO: 26.3 PG (ref 26–34)
MCHC RBC AUTO-ENTMCNC: 31.2 G/DL (ref 30–36.5)
MCV RBC AUTO: 84.4 FL (ref 80–99)
MONOCYTES # BLD: 1 K/UL (ref 0–1)
MONOCYTES NFR BLD: 13 % (ref 5–13)
MUCOUS THREADS URNS QL MICRO: ABNORMAL /LPF
NEUTS SEG # BLD: 4.1 K/UL (ref 1.8–8)
NEUTS SEG NFR BLD: 56 % (ref 32–75)
NITRITE UR QL STRIP.AUTO: NEGATIVE
NRBC # BLD: 0 K/UL (ref 0–0.01)
NRBC BLD-RTO: 0 PER 100 WBC
PH UR STRIP: 5 (ref 5–8)
PLATELET # BLD AUTO: 282 K/UL (ref 150–400)
PMV BLD AUTO: 9.9 FL (ref 8.9–12.9)
POTASSIUM SERPL-SCNC: 3.6 MMOL/L (ref 3.5–5.1)
PROT SERPL-MCNC: 5.7 G/DL (ref 6.4–8.2)
PROT UR STRIP-MCNC: NEGATIVE MG/DL
RBC # BLD AUTO: 4.22 M/UL (ref 3.8–5.2)
RBC #/AREA URNS HPF: ABNORMAL /HPF (ref 0–5)
SODIUM SERPL-SCNC: 138 MMOL/L (ref 136–145)
SP GR UR REFRACTOMETRY: 1.01 (ref 1–1.03)
TROPONIN I SERPL HS-MCNC: 24 NG/L (ref 0–51)
URINE CULTURE IF INDICATED: ABNORMAL
UROBILINOGEN UR QL STRIP.AUTO: 0.1 EU/DL (ref 0.1–1)
WBC # BLD AUTO: 7.3 K/UL (ref 3.6–11)
WBC URNS QL MICRO: ABNORMAL /HPF (ref 0–4)

## 2023-09-19 PROCEDURE — 83880 ASSAY OF NATRIURETIC PEPTIDE: CPT

## 2023-09-19 PROCEDURE — 84484 ASSAY OF TROPONIN QUANT: CPT

## 2023-09-19 PROCEDURE — 99285 EMERGENCY DEPT VISIT HI MDM: CPT

## 2023-09-19 PROCEDURE — 36415 COLL VENOUS BLD VENIPUNCTURE: CPT

## 2023-09-19 PROCEDURE — 93005 ELECTROCARDIOGRAM TRACING: CPT | Performed by: EMERGENCY MEDICINE

## 2023-09-19 PROCEDURE — 81001 URINALYSIS AUTO W/SCOPE: CPT

## 2023-09-19 PROCEDURE — 71045 X-RAY EXAM CHEST 1 VIEW: CPT

## 2023-09-19 PROCEDURE — 80053 COMPREHEN METABOLIC PANEL: CPT

## 2023-09-19 PROCEDURE — 85025 COMPLETE CBC W/AUTO DIFF WBC: CPT

## 2023-09-19 RX ORDER — FUROSEMIDE 20 MG/1
20 TABLET ORAL DAILY
Qty: 5 TABLET | Refills: 0 | Status: SHIPPED | OUTPATIENT
Start: 2023-09-19 | End: 2023-09-24

## 2023-09-19 ASSESSMENT — PAIN - FUNCTIONAL ASSESSMENT: PAIN_FUNCTIONAL_ASSESSMENT: NONE - DENIES PAIN

## 2023-09-19 ASSESSMENT — LIFESTYLE VARIABLES
HOW OFTEN DO YOU HAVE A DRINK CONTAINING ALCOHOL: NEVER
HOW MANY STANDARD DRINKS CONTAINING ALCOHOL DO YOU HAVE ON A TYPICAL DAY: PATIENT DOES NOT DRINK

## 2023-09-19 NOTE — ED NOTES
Ambulatory trial completed with pt. Pt was able to ambulate approximately 15-20 ft with slow, shuffling gait. Pt's daughter states that this is normal ambulation for the pt. Pt did not experience any SOB or difficulty with ambulation. Pt back in bed and resting comfortably at this time.       Kiara Kapoor RN  09/19/23 [id number]

## 2023-09-19 NOTE — ED TRIAGE NOTES
Per pt daughter pt Dx with pna approx 3 weeks, since last week pt has being fatigue, generalized weakness , shortness of breath. Has hx of dementia.

## 2023-09-19 NOTE — DISCHARGE INSTRUCTIONS
Thank you! Thank you for allowing me to care for you in the emergency department. It is my goal to provide you with excellent care. If you have not received excellent quality care, please ask to speak to the nurse manager. Please fill out the survey that will come to you by mail or email since we listen to your feedback! Below you will find a list of your tests from today's visit. Should you have any questions, please do not hesitate to call the emergency department.     Labs  Recent Results (from the past 12 hour(s))   CMP    Collection Time: 09/19/23 11:11 AM   Result Value Ref Range    Sodium 138 136 - 145 mmol/L    Potassium 3.6 3.5 - 5.1 mmol/L    Chloride 105 97 - 108 mmol/L    CO2 24 21 - 32 mmol/L    Anion Gap 9 5 - 15 mmol/L    Glucose 98 65 - 100 mg/dL    BUN 18 6 - 20 mg/dL    Creatinine 0.78 0.55 - 1.02 mg/dL    Bun/Cre Ratio 23 (H) 12 - 20      Est, Glom Filt Rate >60 >60 ml/min/1.73m2    Calcium 8.5 8.5 - 10.1 mg/dL    Total Bilirubin 0.9 0.2 - 1.0 mg/dL    AST 22 15 - 37 U/L    ALT 26 12 - 78 U/L    Alk Phosphatase 57 45 - 117 U/L    Total Protein 5.7 (L) 6.4 - 8.2 g/dL    Albumin 2.7 (L) 3.5 - 5.0 g/dL    Globulin 3.0 2.0 - 4.0 g/dL    Albumin/Globulin Ratio 0.9 (L) 1.1 - 2.2     CBC with Auto Differential    Collection Time: 09/19/23 11:11 AM   Result Value Ref Range    WBC 7.3 3.6 - 11.0 K/uL    RBC 4.22 3.80 - 5.20 M/uL    Hemoglobin 11.1 (L) 11.5 - 16.0 g/dL    Hematocrit 35.6 35.0 - 47.0 %    MCV 84.4 80.0 - 99.0 FL    MCH 26.3 26.0 - 34.0 PG    MCHC 31.2 30.0 - 36.5 g/dL    RDW 13.6 11.5 - 14.5 %    Platelets 900 171 - 653 K/uL    MPV 9.9 8.9 - 12.9 FL    Nucleated RBCs 0.0 0.0  WBC    nRBC 0.00 0.00 - 0.01 K/uL    Neutrophils % 56 32 - 75 %    Lymphocytes % 27 12 - 49 %    Monocytes % 13 5 - 13 %    Eosinophils % 3 0 - 7 %    Basophils % 0 0 - 1 %    Immature Granulocytes 1 (H) 0 - 0.5 %    Neutrophils Absolute 4.1 1.8 - 8.0 K/UL    Lymphocytes Absolute 1.9 0.8 - 3.5 K/UL

## 2023-09-20 LAB
EKG ATRIAL RATE: 92 BPM
EKG DIAGNOSIS: NORMAL
EKG P AXIS: 63 DEGREES
EKG P-R INTERVAL: 220 MS
EKG Q-T INTERVAL: 386 MS
EKG QRS DURATION: 138 MS
EKG QTC CALCULATION (BAZETT): 477 MS
EKG R AXIS: -59 DEGREES
EKG T AXIS: -1 DEGREES
EKG VENTRICULAR RATE: 92 BPM

## 2023-09-21 ENCOUNTER — HOSPITAL ENCOUNTER (EMERGENCY)
Facility: HOSPITAL | Age: 88
Discharge: HOME | End: 2023-09-21
Attending: STUDENT IN AN ORGANIZED HEALTH CARE EDUCATION/TRAINING PROGRAM
Payer: MEDICARE

## 2023-09-21 ENCOUNTER — APPOINTMENT (OUTPATIENT)
Facility: HOSPITAL | Age: 88
End: 2023-09-21
Payer: MEDICARE

## 2023-09-21 VITALS
RESPIRATION RATE: 16 BRPM | TEMPERATURE: 98.4 F | DIASTOLIC BLOOD PRESSURE: 80 MMHG | SYSTOLIC BLOOD PRESSURE: 134 MMHG | HEART RATE: 88 BPM | OXYGEN SATURATION: 99 %

## 2023-09-21 DIAGNOSIS — S50.01XA CONTUSION OF RIGHT ELBOW, INITIAL ENCOUNTER: ICD-10-CM

## 2023-09-21 DIAGNOSIS — S51.011A SKIN TEAR OF RIGHT ELBOW WITHOUT COMPLICATION, INITIAL ENCOUNTER: ICD-10-CM
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
## 2023-09-21 PROCEDURE — 90471 IMMUNIZATION ADMIN: CPT | Performed by: STUDENT IN AN ORGANIZED HEALTH CARE EDUCATION/TRAINING PROGRAM

## 2023-09-21 PROCEDURE — 6360000002 HC RX W HCPCS: Performed by: STUDENT IN AN ORGANIZED HEALTH CARE EDUCATION/TRAINING PROGRAM

## 2023-09-21 PROCEDURE — 73070 X-RAY EXAM OF ELBOW: CPT

## 2023-09-21 PROCEDURE — 99284 EMERGENCY DEPT VISIT MOD MDM: CPT

## 2023-09-21 PROCEDURE — 70450 CT HEAD/BRAIN W/O DYE: CPT

## 2023-09-21 PROCEDURE — 90714 TD VACC NO PRESV 7 YRS+ IM: CPT | Performed by: STUDENT IN AN ORGANIZED HEALTH CARE EDUCATION/TRAINING PROGRAM

## 2023-09-21 PROCEDURE — 73502 X-RAY EXAM HIP UNI 2-3 VIEWS: CPT

## 2023-09-21 RX ADMIN — CLOSTRIDIUM TETANI TOXOID ANTIGEN (FORMALDEHYDE INACTIVATED) AND CORYNEBACTERIUM DIPHTHERIAE TOXOID ANTIGEN (FORMALDEHYDE INACTIVATED) 0.5 ML: 5; 2 INJECTION, SUSPENSION INTRAMUSCULAR at 11:59

## 2023-09-21 NOTE — ED TRIAGE NOTES
From home with hx of dementia and recent dx of chf.  Stumbled and fell getting out of bed this morning and sustained a large skin tear to her right arm. No other injuries noted. Pleasant and conversive but confused. Recently started lasix which causes urinary urgency, which may have contributed to her fall. She used the [facility] immediately upon arrival and her legs are noted to be quite weak.

## 2023-09-21 NOTE — ED PROVIDER NOTES
[facility] EMERGENCY DEPT  EMERGENCY DEPARTMENT HISTORY AND PHYSICAL EXAM      Date: 9/21/2023  Patient Name: Anh Rodriguez  MRN: [id number]  [facility] 6/13/1928  Date of evaluation: 9/21/2023  Provider: Cody Chavarria MD   Note Started: 10:59 AM EDT 9/21/23    HISTORY OF PRESENT ILLNESS     Chief Complaint   Patient presents with    Fall    Arm Injury       History Provided By: Patient, bernice    HPI: Anh Rodriguez is a 80 y.o. female presents to the emergency department for evaluation of mechanical fall, skin tear to right forearm. Patient reportedly stumbled and fell onto her right side, her daughter noted her to be on her back, unclear if there is any loss of consciousness. Patient denies however patient does have a history of dementia difficult to obtain accurate history. Patient complaining of some mild elbow pain, right hip pain, denies any headaches, blurry vision double vision, no chest pain or shortness of breath. Patient is not currently on any anticoagulation    PAST MEDICAL HISTORY   Past Medical History:  Past Medical History:   Diagnosis Date    Dementia ([address])     Hypercholesterolemia     Hypertension     Macular degeneration        Past Surgical History:  Past Surgical History:   Procedure Laterality Date    CHOLECYSTECTOMY  1995    HAMMER TOE SURGERY  2000    HYSTERECTOMY (CERVIX STATUS UNKNOWN)  [facility]    plate/7 screws in collar bone    OTHER SURGICAL HISTORY  1995 & 1996    heel spur    [place] UNLISTED PROCEDURE CARDIAC SURGERY      cabg 2006       Family History:  Family History   Problem Relation Age of Onset    Cancer Daughter     Heart Disease Son     Heart Disease Sister     Cancer Son        Social History:  Social History     Tobacco Use    Smoking status: Former    Smokeless tobacco: Never   Substance Use Topics    Alcohol use: No       Allergies:   Allergies   Allergen Reactions    Cephalexin      Other reaction(s): Unknown (comments)    Meclizine Other reaction(s): Unknown (comments)    Metronidazole      Other reaction(s): Unknown (comments)    Nitrofurantoin      Other reaction(s): Unknown (comments)    Norfloxacin      Other reaction(s): Unknown (comments)    Statins Other (See Comments)     Reaction Type: Allergy; Reaction(s): Altered mental status  Reaction Type: Allergy; Reaction(s): Altered mental status      Sulfa Antibiotics      Other reaction(s): Unknown (comments)    Tramadol      Other reaction(s): Unknown (comments)    Trimethoprim      Other reaction(s): Unknown (comments)    Oxycodone-Acetaminophen Rash    Sulfamethoxazole-Trimethoprim      Other reaction(s): Unknown  Reaction Type: Allergy  Other reaction(s): Unknown (comments)  Other reaction(s): Unknown (comments)         PCP: Ana Brar MD    Current Meds:   No current facility-administered medications for this encounter.      Current Outpatient Medications   Medication Sig Dispense Refill    furosemide (LASIX) 20 MG tablet Take 1 tablet by mouth daily for 5 days 5 tablet 0    ASPIRIN 81 PO Take 81 mg by mouth      vitamin D (CHOLECALCIFEROL) 25 MCG (1000 UT) TABS tablet Take 25 mcg by mouth daily      METOPROLOL SUCCINATE PO Take 12.5 mg by mouth 2 times daily      albuterol sulfate HFA (PROVENTIL;VENTOLIN;PROAIR) 108 (90 Base) MCG/ACT inhaler Inhale 1-2 puffs into the lungs every 4 hours as needed         Social Determinants of Health:   Social Determinants of Health     Tobacco Use: Medium Risk (9/19/2023)    Patient History     Smoking Tobacco Use: Former     Smokeless Tobacco Use: Never     Passive Exposure: Not on file   Alcohol Use: Not At Risk (9/19/2023)    AUDIT-C     Frequency of Alcohol Consumption: Never     Average Number of Drinks: Patient does not drink     Frequency of Binge Drinking: Never   Financial Resource Strain: Not on file   Food Insecurity: Not on file   Transportation Needs: Not on file   Physical Activity: Not on file   Stress: Not on file   Social

## 2023-09-28 ENCOUNTER — APPOINTMENT (OUTPATIENT)
Facility: HOSPITAL | Age: 88
DRG: 689 | End: 2023-09-28
Payer: MEDICARE

## 2023-09-28 ENCOUNTER — HOSPITAL ENCOUNTER (INPATIENT)
Facility: HOSPITAL | Age: 88
LOS: 4 days | Discharge: NURSING, REHAB OR LONG-TERM CARE | DRG: 689 | End: 2023-10-02
Attending: STUDENT IN AN ORGANIZED HEALTH CARE EDUCATION/TRAINING PROGRAM | Admitting: HOSPITALIST
Payer: MEDICARE

## 2023-09-28 DIAGNOSIS — I10 ESSENTIAL HYPERTENSION: ICD-10-CM

## 2023-09-28 DIAGNOSIS — R53.1 GENERALIZED WEAKNESS: ICD-10-CM

## 2023-09-28 DIAGNOSIS — N12 PYELONEPHRITIS: Primary | ICD-10-CM

## 2023-09-28 PROBLEM — M54.9 BACK PAIN: Status: ACTIVE | Noted: 2023-09-28

## 2023-09-28 PROBLEM — N39.0 UTI (URINARY TRACT INFECTION): Status: ACTIVE | Noted: 2023-09-28

## 2023-09-28 PROBLEM — R41.82 ALTERED MENTAL STATUS: Status: ACTIVE | Noted: 2023-09-28

## 2023-09-28 PROBLEM — E86.0 DEHYDRATION: Status: ACTIVE | Noted: 2023-09-28

## 2023-09-28 LAB
ANION GAP SERPL CALC-SCNC: 11 MMOL/L (ref 5–15)
APPEARANCE UR: ABNORMAL
BACTERIA URNS QL MICRO: ABNORMAL /HPF
BASOPHILS # BLD: 0 K/UL (ref 0–0.1)
BASOPHILS NFR BLD: 0 % (ref 0–1)
BILIRUB UR QL: NEGATIVE
BNP SERPL-MCNC: 1482 PG/ML
BUN SERPL-MCNC: 20 MG/DL (ref 6–20)
BUN/CREAT SERPL: 22 (ref 12–20)
CA-I BLD-MCNC: 8.9 MG/DL (ref 8.5–10.1)
CHLORIDE SERPL-SCNC: 102 MMOL/L (ref 97–108)
CO2 SERPL-SCNC: 26 MMOL/L (ref 21–32)
COLOR UR: ABNORMAL
CREAT SERPL-MCNC: 0.91 MG/DL (ref 0.55–1.02)
DIFFERENTIAL METHOD BLD: ABNORMAL
EOSINOPHIL # BLD: 0.2 K/UL (ref 0–0.4)
EOSINOPHIL NFR BLD: 2 % (ref 0–7)
EPITH CASTS URNS QL MICRO: ABNORMAL /LPF
ERYTHROCYTE [DISTWIDTH] IN BLOOD BY AUTOMATED COUNT: 13.6 % (ref 11.5–14.5)
GLUCOSE SERPL-MCNC: 149 MG/DL (ref 65–100)
GLUCOSE UR STRIP.AUTO-MCNC: NEGATIVE MG/DL
HCT VFR BLD AUTO: 36.3 % (ref 35–47)
HGB BLD-MCNC: 11.6 G/DL (ref 11.5–16)
HGB UR QL STRIP: ABNORMAL
IMM GRANULOCYTES # BLD AUTO: 0.1 K/UL (ref 0–0.04)
IMM GRANULOCYTES NFR BLD AUTO: 1 % (ref 0–0.5)
KETONES UR QL STRIP.AUTO: NEGATIVE MG/DL
LACTATE SERPL-SCNC: 1.4 MMOL/L (ref 0.4–2)
LEUKOCYTE ESTERASE UR QL STRIP.AUTO: ABNORMAL
LYMPHOCYTES # BLD: 1.7 K/UL (ref 0.8–3.5)
LYMPHOCYTES NFR BLD: 20 % (ref 12–49)
MCH RBC QN AUTO: 26.3 PG (ref 26–34)
MCHC RBC AUTO-ENTMCNC: 32 G/DL (ref 30–36.5)
MCV RBC AUTO: 82.3 FL (ref 80–99)
MONOCYTES # BLD: 1.1 K/UL (ref 0–1)
MONOCYTES NFR BLD: 13 % (ref 5–13)
MUCOUS THREADS URNS QL MICRO: ABNORMAL /LPF
NEUTS SEG # BLD: 5.1 K/UL (ref 1.8–8)
NEUTS SEG NFR BLD: 64 % (ref 32–75)
NITRITE UR QL STRIP.AUTO: POSITIVE
NRBC # BLD: 0 K/UL (ref 0–0.01)
NRBC BLD-RTO: 0 PER 100 WBC
OTHER: ABNORMAL
PH UR STRIP: 5 (ref 5–8)
PLATELET # BLD AUTO: 351 K/UL (ref 150–400)
PMV BLD AUTO: 9.5 FL (ref 8.9–12.9)
POTASSIUM SERPL-SCNC: ABNORMAL MMOL/L (ref 3.5–5.1)
PROT UR STRIP-MCNC: 30 MG/DL
RBC # BLD AUTO: 4.41 M/UL (ref 3.8–5.2)
RBC #/AREA URNS HPF: ABNORMAL /HPF (ref 0–5)
SODIUM SERPL-SCNC: 139 MMOL/L (ref 136–145)
SP GR UR REFRACTOMETRY: 1.02 (ref 1–1.03)
TROPONIN I SERPL HS-MCNC: 16 NG/L (ref 0–51)
TROPONIN I SERPL HS-MCNC: 23 NG/L (ref 0–51)
URINE CULTURE IF INDICATED: ABNORMAL
UROBILINOGEN UR QL STRIP.AUTO: 2 EU/DL (ref 0.1–1)
WBC # BLD AUTO: 8.2 K/UL (ref 3.6–11)
WBC URNS QL MICRO: ABNORMAL /HPF (ref 0–4)

## 2023-09-28 PROCEDURE — 84484 ASSAY OF TROPONIN QUANT: CPT

## 2023-09-28 PROCEDURE — 87186 SC STD MICRODIL/AGAR DIL: CPT

## 2023-09-28 PROCEDURE — 83880 ASSAY OF NATRIURETIC PEPTIDE: CPT

## 2023-09-28 PROCEDURE — 93005 ELECTROCARDIOGRAM TRACING: CPT | Performed by: STUDENT IN AN ORGANIZED HEALTH CARE EDUCATION/TRAINING PROGRAM

## 2023-09-28 PROCEDURE — 72192 CT PELVIS W/O DYE: CPT

## 2023-09-28 PROCEDURE — 1100000000 HC RM PRIVATE

## 2023-09-28 PROCEDURE — 71045 X-RAY EXAM CHEST 1 VIEW: CPT

## 2023-09-28 PROCEDURE — 87086 URINE CULTURE/COLONY COUNT: CPT

## 2023-09-28 PROCEDURE — 96361 HYDRATE IV INFUSION ADD-ON: CPT

## 2023-09-28 PROCEDURE — 81001 URINALYSIS AUTO W/SCOPE: CPT

## 2023-09-28 PROCEDURE — 72128 CT CHEST SPINE W/O DYE: CPT

## 2023-09-28 PROCEDURE — 83605 ASSAY OF LACTIC ACID: CPT

## 2023-09-28 PROCEDURE — 87077 CULTURE AEROBIC IDENTIFY: CPT

## 2023-09-28 PROCEDURE — 72131 CT LUMBAR SPINE W/O DYE: CPT

## 2023-09-28 PROCEDURE — 96360 HYDRATION IV INFUSION INIT: CPT

## 2023-09-28 PROCEDURE — 85025 COMPLETE CBC W/AUTO DIFF WBC: CPT

## 2023-09-28 PROCEDURE — 36415 COLL VENOUS BLD VENIPUNCTURE: CPT

## 2023-09-28 PROCEDURE — 99285 EMERGENCY DEPT VISIT HI MDM: CPT

## 2023-09-28 PROCEDURE — 80048 BASIC METABOLIC PNL TOTAL CA: CPT

## 2023-09-28 PROCEDURE — 94761 N-INVAS EAR/PLS OXIMETRY MLT: CPT

## 2023-09-28 PROCEDURE — 2580000003 HC RX 258: Performed by: STUDENT IN AN ORGANIZED HEALTH CARE EDUCATION/TRAINING PROGRAM

## 2023-09-28 RX ORDER — ONDANSETRON 4 MG/1
4 TABLET, ORALLY DISINTEGRATING ORAL EVERY 8 HOURS PRN
Status: DISCONTINUED | OUTPATIENT
Start: 2023-09-28 | End: 2023-10-02 | Stop reason: HOSPADM

## 2023-09-28 RX ORDER — ONDANSETRON 2 MG/ML
4 INJECTION INTRAMUSCULAR; INTRAVENOUS EVERY 6 HOURS PRN
Status: DISCONTINUED | OUTPATIENT
Start: 2023-09-28 | End: 2023-10-02 | Stop reason: HOSPADM

## 2023-09-28 RX ORDER — ASPIRIN 81 MG/1
81 TABLET, CHEWABLE ORAL DAILY
Status: DISCONTINUED | OUTPATIENT
Start: 2023-09-29 | End: 2023-10-02 | Stop reason: HOSPADM

## 2023-09-28 RX ORDER — SODIUM CHLORIDE 9 MG/ML
INJECTION, SOLUTION INTRAVENOUS PRN
Status: DISCONTINUED | OUTPATIENT
Start: 2023-09-28 | End: 2023-10-02 | Stop reason: HOSPADM

## 2023-09-28 RX ORDER — MEMANTINE HYDROCHLORIDE 5 MG/1
5 TABLET ORAL 2 TIMES DAILY
COMMUNITY
Start: 2023-08-21

## 2023-09-28 RX ORDER — VITAMIN B COMPLEX
1000 TABLET ORAL DAILY
Status: DISCONTINUED | OUTPATIENT
Start: 2023-09-29 | End: 2023-10-02 | Stop reason: HOSPADM

## 2023-09-28 RX ORDER — ACETAMINOPHEN 650 MG/1
650 SUPPOSITORY RECTAL EVERY 6 HOURS PRN
Status: DISCONTINUED | OUTPATIENT
Start: 2023-09-28 | End: 2023-10-02 | Stop reason: HOSPADM

## 2023-09-28 RX ORDER — 0.9 % SODIUM CHLORIDE 0.9 %
500 INTRAVENOUS SOLUTION INTRAVENOUS ONCE
Status: COMPLETED | OUTPATIENT
Start: 2023-09-28 | End: 2023-09-28

## 2023-09-28 RX ORDER — SODIUM CHLORIDE 0.9 % (FLUSH) 0.9 %
5-40 SYRINGE (ML) INJECTION EVERY 12 HOURS SCHEDULED
Status: DISCONTINUED | OUTPATIENT
Start: 2023-09-28 | End: 2023-10-02 | Stop reason: HOSPADM

## 2023-09-28 RX ORDER — SODIUM CHLORIDE 0.9 % (FLUSH) 0.9 %
5-40 SYRINGE (ML) INJECTION PRN
Status: DISCONTINUED | OUTPATIENT
Start: 2023-09-28 | End: 2023-10-02 | Stop reason: HOSPADM

## 2023-09-28 RX ORDER — SODIUM CHLORIDE, SODIUM LACTATE, POTASSIUM CHLORIDE, CALCIUM CHLORIDE 600; 310; 30; 20 MG/100ML; MG/100ML; MG/100ML; MG/100ML
INJECTION, SOLUTION INTRAVENOUS CONTINUOUS
Status: DISPENSED | OUTPATIENT
Start: 2023-09-28 | End: 2023-09-29

## 2023-09-28 RX ORDER — MEMANTINE HYDROCHLORIDE 10 MG/1
5 TABLET ORAL 2 TIMES DAILY
Status: DISCONTINUED | OUTPATIENT
Start: 2023-09-28 | End: 2023-10-02 | Stop reason: HOSPADM

## 2023-09-28 RX ORDER — ACETAMINOPHEN 325 MG/1
650 TABLET ORAL EVERY 6 HOURS PRN
Status: DISCONTINUED | OUTPATIENT
Start: 2023-09-28 | End: 2023-10-02 | Stop reason: HOSPADM

## 2023-09-28 RX ADMIN — SODIUM CHLORIDE 500 ML: 9 INJECTION, SOLUTION INTRAVENOUS at 18:19

## 2023-09-28 ASSESSMENT — PAIN - FUNCTIONAL ASSESSMENT: PAIN_FUNCTIONAL_ASSESSMENT: NONE - DENIES PAIN

## 2023-09-28 ASSESSMENT — PAIN SCALES - GENERAL: PAINLEVEL_OUTOF10: 0

## 2023-09-28 NOTE — ED PROVIDER NOTES
ondansetron (ZOFRAN) injection 4 mg  4 mg IntraVENous Q6H PRN Lee Wong MD        acetaminophen (TYLENOL) tablet 650 mg  650 mg Oral Q6H PRN Lee Wong MD        Or    acetaminophen (TYLENOL) suppository 650 mg  650 mg Rectal Q6H PRN Lee Wong MD           Social Determinants of Health:   Social Determinants of Health     Tobacco Use: Medium Risk (9/19/2023)    Patient History     Smoking Tobacco Use: Former     Smokeless Tobacco Use: Never     Passive Exposure: Not on file   Alcohol Use: Not At Risk (9/19/2023)    AUDIT-C     Frequency of Alcohol Consumption: Never     Average Number of Drinks: Patient does not drink     Frequency of Binge Drinking: Never   Financial Resource Strain: Not on file   Food Insecurity: Not on file   Transportation Needs: Not on file   Physical Activity: Not on file   Stress: Not on file   Social Connections: Not on file   Intimate Partner Violence: Not on file   Depression: Not on file   Housing Stability: Not on file       PHYSICAL EXAM   Physical Exam  Constitutional:       Comments: Chronically frail nontoxic-appearing elderly female in no acute distress speaking full sentences   HENT:      Head: Normocephalic and atraumatic. Eyes:      Extraocular Movements: Extraocular movements intact. Conjunctiva/sclera: Conjunctivae normal.   Cardiovascular:      Rate and Rhythm: Normal rate and regular rhythm. Abdominal:      Tenderness: There is no abdominal tenderness. There is left CVA tenderness. There is no right CVA tenderness. Musculoskeletal:         General: No swelling, tenderness, deformity or signs of injury. Cervical back: Normal range of motion and neck supple. Neurological:      General: No focal deficit present. Mental Status: Mental status is at baseline. SCREENINGS               LAB, EKG AND DIAGNOSTIC RESULTS   Labs:  No results found for this or any previous visit (from the past 12 hour(s)).     Radiologic

## 2023-09-28 NOTE — ED TRIAGE NOTES
Pt sent in by Dr Kimi Joyner for left mid back pain, no urination since this morning.  Hx CHF, HTN, dementia

## 2023-09-29 ENCOUNTER — APPOINTMENT (OUTPATIENT)
Facility: HOSPITAL | Age: 88
DRG: 689 | End: 2023-09-29
Payer: MEDICARE

## 2023-09-29 LAB
ALBUMIN SERPL-MCNC: 2.4 G/DL (ref 3.5–5)
ANION GAP SERPL CALC-SCNC: 11 MMOL/L (ref 5–15)
BASOPHILS # BLD: 0 K/UL (ref 0–0.1)
BASOPHILS NFR BLD: 0 % (ref 0–1)
BUN SERPL-MCNC: 14 MG/DL (ref 6–20)
BUN/CREAT SERPL: 19 (ref 12–20)
CA-I BLD-MCNC: 8.4 MG/DL (ref 8.5–10.1)
CHLORIDE SERPL-SCNC: 107 MMOL/L (ref 97–108)
CO2 SERPL-SCNC: 22 MMOL/L (ref 21–32)
CREAT SERPL-MCNC: 0.75 MG/DL (ref 0.55–1.02)
DIFFERENTIAL METHOD BLD: ABNORMAL
EKG ATRIAL RATE: 106 BPM
EKG DIAGNOSIS: NORMAL
EKG P AXIS: 55 DEGREES
EKG P-R INTERVAL: 204 MS
EKG Q-T INTERVAL: 364 MS
EKG QRS DURATION: 134 MS
EKG QTC CALCULATION (BAZETT): 483 MS
EKG R AXIS: -58 DEGREES
EKG T AXIS: -22 DEGREES
EKG VENTRICULAR RATE: 106 BPM
EOSINOPHIL # BLD: 0.1 K/UL (ref 0–0.4)
EOSINOPHIL NFR BLD: 1 % (ref 0–7)
ERYTHROCYTE [DISTWIDTH] IN BLOOD BY AUTOMATED COUNT: 13.8 % (ref 11.5–14.5)
GLUCOSE SERPL-MCNC: 120 MG/DL (ref 65–100)
HCT VFR BLD AUTO: 35.8 % (ref 35–47)
HGB BLD-MCNC: 10.9 G/DL (ref 11.5–16)
IMM GRANULOCYTES # BLD AUTO: 0.1 K/UL (ref 0–0.04)
IMM GRANULOCYTES NFR BLD AUTO: 1 % (ref 0–0.5)
LYMPHOCYTES # BLD: 1.7 K/UL (ref 0.8–3.5)
LYMPHOCYTES NFR BLD: 18 % (ref 12–49)
MCH RBC QN AUTO: 26 PG (ref 26–34)
MCHC RBC AUTO-ENTMCNC: 30.4 G/DL (ref 30–36.5)
MCV RBC AUTO: 85.4 FL (ref 80–99)
MONOCYTES # BLD: 1.1 K/UL (ref 0–1)
MONOCYTES NFR BLD: 11 % (ref 5–13)
NEUTS SEG # BLD: 6.5 K/UL (ref 1.8–8)
NEUTS SEG NFR BLD: 69 % (ref 32–75)
NRBC # BLD: 0 K/UL (ref 0–0.01)
NRBC BLD-RTO: 0 PER 100 WBC
PHOSPHATE SERPL-MCNC: 2.3 MG/DL (ref 2.6–4.7)
PLATELET # BLD AUTO: 327 K/UL (ref 150–400)
PMV BLD AUTO: 9.7 FL (ref 8.9–12.9)
POTASSIUM SERPL-SCNC: 3.1 MMOL/L (ref 3.5–5.1)
RBC # BLD AUTO: 4.19 M/UL (ref 3.8–5.2)
SODIUM SERPL-SCNC: 140 MMOL/L (ref 136–145)
WBC # BLD AUTO: 9.5 K/UL (ref 3.6–11)

## 2023-09-29 PROCEDURE — 2580000003 HC RX 258: Performed by: HOSPITALIST

## 2023-09-29 PROCEDURE — 80069 RENAL FUNCTION PANEL: CPT

## 2023-09-29 PROCEDURE — 6370000000 HC RX 637 (ALT 250 FOR IP): Performed by: HOSPITALIST

## 2023-09-29 PROCEDURE — 6370000000 HC RX 637 (ALT 250 FOR IP): Performed by: INTERNAL MEDICINE

## 2023-09-29 PROCEDURE — 85025 COMPLETE CBC W/AUTO DIFF WBC: CPT

## 2023-09-29 PROCEDURE — 6370000000 HC RX 637 (ALT 250 FOR IP): Performed by: STUDENT IN AN ORGANIZED HEALTH CARE EDUCATION/TRAINING PROGRAM

## 2023-09-29 PROCEDURE — 94760 N-INVAS EAR/PLS OXIMETRY 1: CPT

## 2023-09-29 PROCEDURE — 71045 X-RAY EXAM CHEST 1 VIEW: CPT

## 2023-09-29 PROCEDURE — 1100000000 HC RM PRIVATE

## 2023-09-29 PROCEDURE — 36415 COLL VENOUS BLD VENIPUNCTURE: CPT

## 2023-09-29 RX ORDER — LEVOFLOXACIN 5 MG/ML
750 INJECTION, SOLUTION INTRAVENOUS EVERY 24 HOURS
Status: DISCONTINUED | OUTPATIENT
Start: 2023-09-29 | End: 2023-09-29

## 2023-09-29 RX ORDER — CHOLECALCIFEROL (VITAMIN D3) 125 MCG
10 CAPSULE ORAL NIGHTLY PRN
Status: DISCONTINUED | OUTPATIENT
Start: 2023-09-29 | End: 2023-10-02 | Stop reason: HOSPADM

## 2023-09-29 RX ORDER — QUETIAPINE FUMARATE 25 MG/1
12.5 TABLET, FILM COATED ORAL NIGHTLY PRN
Status: DISCONTINUED | OUTPATIENT
Start: 2023-09-29 | End: 2023-10-02 | Stop reason: HOSPADM

## 2023-09-29 RX ORDER — LEVOFLOXACIN 5 MG/ML
750 INJECTION, SOLUTION INTRAVENOUS
Status: DISCONTINUED | OUTPATIENT
Start: 2023-09-29 | End: 2023-09-29

## 2023-09-29 RX ORDER — POTASSIUM CHLORIDE 20 MEQ/1
40 TABLET, EXTENDED RELEASE ORAL ONCE
Status: DISCONTINUED | OUTPATIENT
Start: 2023-09-29 | End: 2023-09-29

## 2023-09-29 RX ADMIN — SODIUM CHLORIDE, POTASSIUM CHLORIDE, SODIUM LACTATE AND CALCIUM CHLORIDE: 600; 310; 30; 20 INJECTION, SOLUTION INTRAVENOUS at 02:02

## 2023-09-29 RX ADMIN — METOPROLOL TARTRATE 25 MG: 25 TABLET, FILM COATED ORAL at 20:47

## 2023-09-29 RX ADMIN — MEMANTINE HYDROCHLORIDE 5 MG: 10 TABLET ORAL at 20:46

## 2023-09-29 RX ADMIN — Medication 1000 UNITS: at 09:55

## 2023-09-29 RX ADMIN — METOPROLOL TARTRATE 25 MG: 25 TABLET, FILM COATED ORAL at 00:26

## 2023-09-29 RX ADMIN — LEVOFLOXACIN 750 MG: 500 TABLET, FILM COATED ORAL at 14:39

## 2023-09-29 RX ADMIN — ASPIRIN 81 MG CHEWABLE TABLET 81 MG: 81 TABLET CHEWABLE at 09:55

## 2023-09-29 RX ADMIN — Medication 10 MG: at 21:50

## 2023-09-29 RX ADMIN — QUETIAPINE FUMARATE 12.5 MG: 25 TABLET ORAL at 21:53

## 2023-09-29 RX ADMIN — MEMANTINE HYDROCHLORIDE 5 MG: 10 TABLET ORAL at 00:26

## 2023-09-29 RX ADMIN — METOPROLOL TARTRATE 25 MG: 25 TABLET, FILM COATED ORAL at 09:57

## 2023-09-29 RX ADMIN — METOPROLOL TARTRATE 25 MG: 25 TABLET, FILM COATED ORAL at 14:40

## 2023-09-29 RX ADMIN — POTASSIUM BICARBONATE 40 MEQ: 782 TABLET, EFFERVESCENT ORAL at 20:46

## 2023-09-29 RX ADMIN — MEMANTINE HYDROCHLORIDE 5 MG: 10 TABLET ORAL at 09:56

## 2023-09-29 RX ADMIN — METOPROLOL TARTRATE 25 MG: 25 TABLET, FILM COATED ORAL at 17:14

## 2023-09-29 ASSESSMENT — PAIN SCALES - GENERAL: PAINLEVEL_OUTOF10: 0

## 2023-09-29 NOTE — H&P
Radiologic Studies :   Imaging:   XR CHEST PORTABLE   Final Result      Small left pleural effusion with underlying atelectasis. CT LUMBAR SPINE WO CONTRAST    (Results Pending)   CT THORACIC SPINE WO CONTRAST    (Results Pending)   CT PELVIS WO CONTRAST Additional Contrast? None    (Results Pending)          Assessment and Plan : Altered mental status: Likely due to infectious process. Mild. Urinary tract infection: Cultures obtained, analysis with nitrates and leukocyte esterase positive. Sample that was collected 10 days ago does not show any significant signs of infection. Dehydration: Secondary to poor oral intake, started on IV fluids and will monitor closely    Essential hypertension: On Lopressor which we will continue    Dementia senile with no behavior disturbance on Namenda which I will continue    Admitted to medical floor. Medications Home :    Reviewed    Code status : Full code    VTE prophylaxis :  Enoxaparin    Advance Medical directive : Health care decision maker information is on file. Discussion/MDM:   I have discussed patient's presentation/findings and clinical course to date with ED provider. Given the patient's current clinical presentation, I have a high level of concern for decompensation if discharged from the emergency department as patient has multiple medical comorbidities with increased risk of morbidity and mortality  that warrants admission to hospital.     I have reviewed patient's presenting subjective and objective findings, as well as all laboratory studies, imaging studies, and vital signs to date as well as treatment rendered and patient's response to those treatments. In addition, prior medical, surgical and relevant social and family histories were reviewed. CC : Jennifer Goff MD  Signed By: Vanessa Cortes MD     September 28, 2023      This dictation was done by dragon, computer voice recognition software.

## 2023-09-30 LAB
ALBUMIN SERPL-MCNC: 2.1 G/DL (ref 3.5–5)
ALBUMIN/GLOB SERPL: 0.6 (ref 1.1–2.2)
ALP SERPL-CCNC: 49 U/L (ref 45–117)
ALT SERPL-CCNC: 25 U/L (ref 12–78)
ANION GAP SERPL CALC-SCNC: 8 MMOL/L (ref 5–15)
AST SERPL W P-5'-P-CCNC: 24 U/L (ref 15–37)
BASOPHILS # BLD: 0 K/UL (ref 0–0.1)
BASOPHILS NFR BLD: 0 % (ref 0–1)
BILIRUB SERPL-MCNC: 0.6 MG/DL (ref 0.2–1)
BUN SERPL-MCNC: 15 MG/DL (ref 6–20)
BUN/CREAT SERPL: 19 (ref 12–20)
CA-I BLD-MCNC: 8.2 MG/DL (ref 8.5–10.1)
CHLORIDE SERPL-SCNC: 104 MMOL/L (ref 97–108)
CO2 SERPL-SCNC: 26 MMOL/L (ref 21–32)
CREAT SERPL-MCNC: 0.81 MG/DL (ref 0.55–1.02)
DIFFERENTIAL METHOD BLD: ABNORMAL
EOSINOPHIL # BLD: 0.1 K/UL (ref 0–0.4)
EOSINOPHIL NFR BLD: 2 % (ref 0–7)
ERYTHROCYTE [DISTWIDTH] IN BLOOD BY AUTOMATED COUNT: 13.5 % (ref 11.5–14.5)
GLOBULIN SER CALC-MCNC: 3.3 G/DL (ref 2–4)
GLUCOSE SERPL-MCNC: 119 MG/DL (ref 65–100)
HCT VFR BLD AUTO: 33 % (ref 35–47)
HGB BLD-MCNC: 10.5 G/DL (ref 11.5–16)
IMM GRANULOCYTES # BLD AUTO: 0.1 K/UL (ref 0–0.04)
IMM GRANULOCYTES NFR BLD AUTO: 1 % (ref 0–0.5)
LYMPHOCYTES # BLD: 2 K/UL (ref 0.8–3.5)
LYMPHOCYTES NFR BLD: 24 % (ref 12–49)
MCH RBC QN AUTO: 26.2 PG (ref 26–34)
MCHC RBC AUTO-ENTMCNC: 31.8 G/DL (ref 30–36.5)
MCV RBC AUTO: 82.3 FL (ref 80–99)
MONOCYTES # BLD: 1 K/UL (ref 0–1)
MONOCYTES NFR BLD: 13 % (ref 5–13)
NEUTS SEG # BLD: 5 K/UL (ref 1.8–8)
NEUTS SEG NFR BLD: 60 % (ref 32–75)
NRBC # BLD: 0 K/UL (ref 0–0.01)
NRBC BLD-RTO: 0 PER 100 WBC
PHOSPHATE SERPL-MCNC: 2.4 MG/DL (ref 2.6–4.7)
PLATELET # BLD AUTO: 295 K/UL (ref 150–400)
PMV BLD AUTO: 9.3 FL (ref 8.9–12.9)
POTASSIUM SERPL-SCNC: 3 MMOL/L (ref 3.5–5.1)
PROT SERPL-MCNC: 5.4 G/DL (ref 6.4–8.2)
RBC # BLD AUTO: 4.01 M/UL (ref 3.8–5.2)
SODIUM SERPL-SCNC: 138 MMOL/L (ref 136–145)
WBC # BLD AUTO: 8.3 K/UL (ref 3.6–11)

## 2023-09-30 PROCEDURE — 6370000000 HC RX 637 (ALT 250 FOR IP): Performed by: STUDENT IN AN ORGANIZED HEALTH CARE EDUCATION/TRAINING PROGRAM

## 2023-09-30 PROCEDURE — 1100000000 HC RM PRIVATE

## 2023-09-30 PROCEDURE — 6370000000 HC RX 637 (ALT 250 FOR IP): Performed by: HOSPITALIST

## 2023-09-30 PROCEDURE — 36415 COLL VENOUS BLD VENIPUNCTURE: CPT

## 2023-09-30 PROCEDURE — 84100 ASSAY OF PHOSPHORUS: CPT

## 2023-09-30 PROCEDURE — 80053 COMPREHEN METABOLIC PANEL: CPT

## 2023-09-30 PROCEDURE — 92610 EVALUATE SWALLOWING FUNCTION: CPT

## 2023-09-30 PROCEDURE — 85025 COMPLETE CBC W/AUTO DIFF WBC: CPT

## 2023-09-30 RX ORDER — FUROSEMIDE 40 MG/1
20 TABLET ORAL DAILY
Status: DISCONTINUED | OUTPATIENT
Start: 2023-09-30 | End: 2023-10-02 | Stop reason: HOSPADM

## 2023-09-30 RX ADMIN — MEMANTINE HYDROCHLORIDE 5 MG: 10 TABLET ORAL at 08:06

## 2023-09-30 RX ADMIN — ASPIRIN 81 MG CHEWABLE TABLET 81 MG: 81 TABLET CHEWABLE at 08:06

## 2023-09-30 RX ADMIN — MEMANTINE HYDROCHLORIDE 5 MG: 10 TABLET ORAL at 22:01

## 2023-09-30 RX ADMIN — METOPROLOL TARTRATE 25 MG: 25 TABLET, FILM COATED ORAL at 08:06

## 2023-09-30 RX ADMIN — Medication 1000 UNITS: at 08:06

## 2023-09-30 RX ADMIN — METOPROLOL TARTRATE 25 MG: 25 TABLET, FILM COATED ORAL at 22:02

## 2023-09-30 RX ADMIN — METOPROLOL TARTRATE 25 MG: 25 TABLET, FILM COATED ORAL at 13:06

## 2023-09-30 RX ADMIN — LEVOFLOXACIN 750 MG: 500 TABLET, FILM COATED ORAL at 13:05

## 2023-09-30 RX ADMIN — FUROSEMIDE 20 MG: 40 TABLET ORAL at 13:06

## 2023-10-01 ENCOUNTER — APPOINTMENT (OUTPATIENT)
Facility: HOSPITAL | Age: 88
DRG: 689 | End: 2023-10-01
Payer: MEDICARE

## 2023-10-01 LAB
ALBUMIN SERPL-MCNC: 2.1 G/DL (ref 3.5–5)
ALBUMIN/GLOB SERPL: 0.6 (ref 1.1–2.2)
ALP SERPL-CCNC: 49 U/L (ref 45–117)
ALT SERPL-CCNC: 29 U/L (ref 12–78)
ANION GAP SERPL CALC-SCNC: 9 MMOL/L (ref 5–15)
AST SERPL W P-5'-P-CCNC: 30 U/L (ref 15–37)
BACTERIA SPEC CULT: ABNORMAL
BASOPHILS # BLD: 0 K/UL (ref 0–0.1)
BASOPHILS NFR BLD: 0 % (ref 0–1)
BILIRUB SERPL-MCNC: 0.6 MG/DL (ref 0.2–1)
BUN SERPL-MCNC: 16 MG/DL (ref 6–20)
BUN/CREAT SERPL: 18 (ref 12–20)
CA-I BLD-MCNC: 8.2 MG/DL (ref 8.5–10.1)
CHLORIDE SERPL-SCNC: 103 MMOL/L (ref 97–108)
CO2 SERPL-SCNC: 25 MMOL/L (ref 21–32)
COLONY COUNT, CNT: ABNORMAL
CREAT SERPL-MCNC: 0.87 MG/DL (ref 0.55–1.02)
DIFFERENTIAL METHOD BLD: ABNORMAL
EOSINOPHIL # BLD: 0.1 K/UL (ref 0–0.4)
EOSINOPHIL NFR BLD: 2 % (ref 0–7)
ERYTHROCYTE [DISTWIDTH] IN BLOOD BY AUTOMATED COUNT: 13.6 % (ref 11.5–14.5)
GLOBULIN SER CALC-MCNC: 3.6 G/DL (ref 2–4)
GLUCOSE SERPL-MCNC: 150 MG/DL (ref 65–100)
HCT VFR BLD AUTO: 33.1 % (ref 35–47)
HGB BLD-MCNC: 10.6 G/DL (ref 11.5–16)
IMM GRANULOCYTES # BLD AUTO: 0.1 K/UL (ref 0–0.04)
IMM GRANULOCYTES NFR BLD AUTO: 1 % (ref 0–0.5)
LYMPHOCYTES # BLD: 1.6 K/UL (ref 0.8–3.5)
LYMPHOCYTES NFR BLD: 20 % (ref 12–49)
Lab: ABNORMAL
MCH RBC QN AUTO: 25.7 PG (ref 26–34)
MCHC RBC AUTO-ENTMCNC: 32 G/DL (ref 30–36.5)
MCV RBC AUTO: 80.3 FL (ref 80–99)
MONOCYTES # BLD: 1 K/UL (ref 0–1)
MONOCYTES NFR BLD: 12 % (ref 5–13)
NEUTS SEG # BLD: 5.3 K/UL (ref 1.8–8)
NEUTS SEG NFR BLD: 65 % (ref 32–75)
NRBC # BLD: 0 K/UL (ref 0–0.01)
NRBC BLD-RTO: 0 PER 100 WBC
PLATELET # BLD AUTO: 332 K/UL (ref 150–400)
PMV BLD AUTO: 9.5 FL (ref 8.9–12.9)
POTASSIUM SERPL-SCNC: 2.9 MMOL/L (ref 3.5–5.1)
PROT SERPL-MCNC: 5.7 G/DL (ref 6.4–8.2)
RBC # BLD AUTO: 4.12 M/UL (ref 3.8–5.2)
SODIUM SERPL-SCNC: 137 MMOL/L (ref 136–145)
WBC # BLD AUTO: 8.1 K/UL (ref 3.6–11)

## 2023-10-01 PROCEDURE — 80053 COMPREHEN METABOLIC PANEL: CPT

## 2023-10-01 PROCEDURE — 85025 COMPLETE CBC W/AUTO DIFF WBC: CPT

## 2023-10-01 PROCEDURE — 97530 THERAPEUTIC ACTIVITIES: CPT

## 2023-10-01 PROCEDURE — 97161 PT EVAL LOW COMPLEX 20 MIN: CPT

## 2023-10-01 PROCEDURE — 76937 US GUIDE VASCULAR ACCESS: CPT

## 2023-10-01 PROCEDURE — 2580000003 HC RX 258: Performed by: HOSPITALIST

## 2023-10-01 PROCEDURE — 71045 X-RAY EXAM CHEST 1 VIEW: CPT

## 2023-10-01 PROCEDURE — 6360000002 HC RX W HCPCS: Performed by: STUDENT IN AN ORGANIZED HEALTH CARE EDUCATION/TRAINING PROGRAM

## 2023-10-01 PROCEDURE — C1751 CATH, INF, PER/CENT/MIDLINE: HCPCS

## 2023-10-01 PROCEDURE — 6370000000 HC RX 637 (ALT 250 FOR IP): Performed by: STUDENT IN AN ORGANIZED HEALTH CARE EDUCATION/TRAINING PROGRAM

## 2023-10-01 PROCEDURE — 1100000000 HC RM PRIVATE

## 2023-10-01 PROCEDURE — 97165 OT EVAL LOW COMPLEX 30 MIN: CPT

## 2023-10-01 PROCEDURE — 05HY33Z INSERTION OF INFUSION DEVICE INTO UPPER VEIN, PERCUTANEOUS APPROACH: ICD-10-PCS | Performed by: INTERNAL MEDICINE

## 2023-10-01 PROCEDURE — 05HD33Z INSERTION OF INFUSION DEVICE INTO RIGHT CEPHALIC VEIN, PERCUTANEOUS APPROACH: ICD-10-PCS | Performed by: INTERNAL MEDICINE

## 2023-10-01 PROCEDURE — 36410 VNPNXR 3YR/> PHY/QHP DX/THER: CPT

## 2023-10-01 PROCEDURE — 2709999900 HC NON-CHARGEABLE SUPPLY

## 2023-10-01 PROCEDURE — 6370000000 HC RX 637 (ALT 250 FOR IP): Performed by: HOSPITALIST

## 2023-10-01 PROCEDURE — 2580000003 HC RX 258: Performed by: STUDENT IN AN ORGANIZED HEALTH CARE EDUCATION/TRAINING PROGRAM

## 2023-10-01 PROCEDURE — 36415 COLL VENOUS BLD VENIPUNCTURE: CPT

## 2023-10-01 RX ORDER — POTASSIUM CHLORIDE 7.45 MG/ML
10 INJECTION INTRAVENOUS
Status: COMPLETED | OUTPATIENT
Start: 2023-10-01 | End: 2023-10-01

## 2023-10-01 RX ORDER — POTASSIUM CHLORIDE 7.45 MG/ML
10 INJECTION INTRAVENOUS
Status: DISCONTINUED | OUTPATIENT
Start: 2023-10-01 | End: 2023-10-01

## 2023-10-01 RX ORDER — SODIUM CHLORIDE 9 MG/ML
INJECTION, SOLUTION INTRAVENOUS CONTINUOUS
Status: CANCELLED | OUTPATIENT
Start: 2023-10-01

## 2023-10-01 RX ORDER — SODIUM CHLORIDE 9 MG/ML
INJECTION, SOLUTION INTRAVENOUS CONTINUOUS
Status: DISCONTINUED | OUTPATIENT
Start: 2023-10-01 | End: 2023-10-01

## 2023-10-01 RX ADMIN — METOPROLOL TARTRATE 25 MG: 25 TABLET, FILM COATED ORAL at 16:35

## 2023-10-01 RX ADMIN — METOPROLOL TARTRATE 25 MG: 25 TABLET, FILM COATED ORAL at 20:21

## 2023-10-01 RX ADMIN — ACETAMINOPHEN 650 MG: 325 TABLET ORAL at 14:34

## 2023-10-01 RX ADMIN — POTASSIUM CHLORIDE 10 MEQ: 7.46 INJECTION, SOLUTION INTRAVENOUS at 21:26

## 2023-10-01 RX ADMIN — MEMANTINE HYDROCHLORIDE 5 MG: 10 TABLET ORAL at 20:21

## 2023-10-01 RX ADMIN — MEMANTINE HYDROCHLORIDE 5 MG: 10 TABLET ORAL at 08:22

## 2023-10-01 RX ADMIN — METOPROLOL TARTRATE 25 MG: 25 TABLET, FILM COATED ORAL at 08:22

## 2023-10-01 RX ADMIN — METOPROLOL TARTRATE 25 MG: 25 TABLET, FILM COATED ORAL at 14:10

## 2023-10-01 RX ADMIN — POTASSIUM CHLORIDE 10 MEQ: 7.46 INJECTION, SOLUTION INTRAVENOUS at 15:16

## 2023-10-01 RX ADMIN — POTASSIUM CHLORIDE 10 MEQ: 7.46 INJECTION, SOLUTION INTRAVENOUS at 18:50

## 2023-10-01 RX ADMIN — SODIUM CHLORIDE: 9 INJECTION, SOLUTION INTRAVENOUS at 16:35

## 2023-10-01 RX ADMIN — FUROSEMIDE 20 MG: 40 TABLET ORAL at 08:23

## 2023-10-01 RX ADMIN — POTASSIUM CHLORIDE 10 MEQ: 7.46 INJECTION, SOLUTION INTRAVENOUS at 22:15

## 2023-10-01 RX ADMIN — ASPIRIN 81 MG CHEWABLE TABLET 81 MG: 81 TABLET CHEWABLE at 08:22

## 2023-10-01 RX ADMIN — POTASSIUM CHLORIDE 10 MEQ: 7.46 INJECTION, SOLUTION INTRAVENOUS at 23:04

## 2023-10-01 RX ADMIN — LEVOFLOXACIN 750 MG: 500 TABLET, FILM COATED ORAL at 08:22

## 2023-10-01 RX ADMIN — POTASSIUM CHLORIDE 10 MEQ: 7.46 INJECTION, SOLUTION INTRAVENOUS at 20:22

## 2023-10-01 RX ADMIN — SODIUM CHLORIDE, PRESERVATIVE FREE 10 ML: 5 INJECTION INTRAVENOUS at 20:22

## 2023-10-01 RX ADMIN — Medication 1000 UNITS: at 08:22

## 2023-10-01 ASSESSMENT — PAIN SCALES - GENERAL
PAINLEVEL_OUTOF10: 0

## 2023-10-01 NOTE — PROCEDURES
Vascular Access Team Consult Note: received consult for PIV placement for hypokalemia treatment from primary care nurses, [facility]. Client remains confused, family approved PIV start at bedside. Ultrasound-guided (USG) PIV placement: see Epic Avatar and EHR flowsheet date for additional vascular access device and procedural information. 22 g 1.75 inch PIV placed with ultrasound-guidance x 1 attempt in left anterior mid-forearm lateral non-cephalic vein. Brisk blood return noted, flushed easily with 10 mL NS. Dressed with sterile skin barrier prep wipe and transparent Tegaderm dressing. Needle-free connector and alcohol swab end caps utilized. Client tolerated well, no patient complaints. Family remains at bedside. Client continues to benefit from ultrasound-guided PIV placement due to limited suitable veins for USG cannulation, vein depth, and small vein diameters. Primary care nurses, [facility], notified. Please re-enter IP PICC Team Consult in [facility] for any further vascular access needs.      Marylen Males, RN
11/30/2024    Required positioning assistance and distraction with 2 staff members assistance. Client tolerated well, no complaints or reports of discomfort. No complications noted. Dressing includes sterile skin barrier prep wipe, BD PowerGlide stabilization securement device, and 3M CHG wafer transparent Tegaderm dressing. Needle-free connectors placed on each port after blood return checks completed. Alcohol swab end caps utilized. Primary care nurses, [facility], notified at bedside. Primary care nurses to hang new IV bag/tubing with next dose due. Discussed interventions to minimize client's pulling behaviors. Site disguised by lightly wrapping with rolled Kurlex gauze, able to easily fit two fingers underneath and roll gauze down to easily visualize insertion site for checks. Encouraged frequent flushing with 10-20 mL NS via push-pause pulsatile flush method to increase patency. Encouraged nursing to discuss with provider slowing potassium rate and Y-siting with IV fluids to dilute to increase line patency and minimize infiltration/extravasation risks with midline PIV catheter. Discussed with providers and nursing if alternate routes or lines were needed for potassium replacements in case line fails.      Megan Canchola RN

## 2023-10-02 VITALS
WEIGHT: 123.6 LBS | HEIGHT: 62 IN | OXYGEN SATURATION: 95 % | BODY MASS INDEX: 22.74 KG/M2 | DIASTOLIC BLOOD PRESSURE: 74 MMHG | RESPIRATION RATE: 16 BRPM | SYSTOLIC BLOOD PRESSURE: 135 MMHG | HEART RATE: 105 BPM | TEMPERATURE: 98.1 F

## 2023-10-02 LAB
ALBUMIN SERPL-MCNC: 2.1 G/DL (ref 3.5–5)
ALBUMIN/GLOB SERPL: 0.6 (ref 1.1–2.2)
ALP SERPL-CCNC: 51 U/L (ref 45–117)
ALT SERPL-CCNC: 43 U/L (ref 12–78)
ANION GAP SERPL CALC-SCNC: 8 MMOL/L (ref 5–15)
AST SERPL W P-5'-P-CCNC: 43 U/L (ref 15–37)
BASOPHILS # BLD: 0 K/UL (ref 0–0.1)
BASOPHILS NFR BLD: 1 % (ref 0–1)
BILIRUB SERPL-MCNC: 0.6 MG/DL (ref 0.2–1)
BUN SERPL-MCNC: 16 MG/DL (ref 6–20)
BUN/CREAT SERPL: 18 (ref 12–20)
CA-I BLD-MCNC: 8.2 MG/DL (ref 8.5–10.1)
CHLORIDE SERPL-SCNC: 106 MMOL/L (ref 97–108)
CO2 SERPL-SCNC: 24 MMOL/L (ref 21–32)
CREAT SERPL-MCNC: 0.89 MG/DL (ref 0.55–1.02)
DIFFERENTIAL METHOD BLD: ABNORMAL
EOSINOPHIL # BLD: 0.2 K/UL (ref 0–0.4)
EOSINOPHIL NFR BLD: 3 % (ref 0–7)
ERYTHROCYTE [DISTWIDTH] IN BLOOD BY AUTOMATED COUNT: 13.8 % (ref 11.5–14.5)
GLOBULIN SER CALC-MCNC: 3.5 G/DL (ref 2–4)
GLUCOSE SERPL-MCNC: 150 MG/DL (ref 65–100)
HCT VFR BLD AUTO: 34 % (ref 35–47)
HGB BLD-MCNC: 10.9 G/DL (ref 11.5–16)
IMM GRANULOCYTES # BLD AUTO: 0.1 K/UL (ref 0–0.04)
IMM GRANULOCYTES NFR BLD AUTO: 1 % (ref 0–0.5)
LYMPHOCYTES # BLD: 1.6 K/UL (ref 0.8–3.5)
LYMPHOCYTES NFR BLD: 20 % (ref 12–49)
MCH RBC QN AUTO: 26 PG (ref 26–34)
MCHC RBC AUTO-ENTMCNC: 32.1 G/DL (ref 30–36.5)
MCV RBC AUTO: 81.1 FL (ref 80–99)
MONOCYTES # BLD: 1 K/UL (ref 0–1)
MONOCYTES NFR BLD: 13 % (ref 5–13)
NEUTS SEG # BLD: 5 K/UL (ref 1.8–8)
NEUTS SEG NFR BLD: 62 % (ref 32–75)
NRBC # BLD: 0 K/UL (ref 0–0.01)
NRBC BLD-RTO: 0 PER 100 WBC
PLATELET # BLD AUTO: 328 K/UL (ref 150–400)
PMV BLD AUTO: 9.1 FL (ref 8.9–12.9)
POTASSIUM SERPL-SCNC: 3.7 MMOL/L (ref 3.5–5.1)
PROT SERPL-MCNC: 5.6 G/DL (ref 6.4–8.2)
RBC # BLD AUTO: 4.19 M/UL (ref 3.8–5.2)
SARS-COV-2 RDRP RESP QL NAA+PROBE: NOT DETECTED
SODIUM SERPL-SCNC: 138 MMOL/L (ref 136–145)
WBC # BLD AUTO: 8 K/UL (ref 3.6–11)

## 2023-10-02 PROCEDURE — 2580000003 HC RX 258: Performed by: HOSPITALIST

## 2023-10-02 PROCEDURE — 6360000002 HC RX W HCPCS: Performed by: PHYSICIAN ASSISTANT

## 2023-10-02 PROCEDURE — 36415 COLL VENOUS BLD VENIPUNCTURE: CPT

## 2023-10-02 PROCEDURE — 85025 COMPLETE CBC W/AUTO DIFF WBC: CPT

## 2023-10-02 PROCEDURE — 80053 COMPREHEN METABOLIC PANEL: CPT

## 2023-10-02 PROCEDURE — 6370000000 HC RX 637 (ALT 250 FOR IP): Performed by: HOSPITALIST

## 2023-10-02 PROCEDURE — 6370000000 HC RX 637 (ALT 250 FOR IP): Performed by: STUDENT IN AN ORGANIZED HEALTH CARE EDUCATION/TRAINING PROGRAM

## 2023-10-02 PROCEDURE — 87635 SARS-COV-2 COVID-19 AMP PRB: CPT

## 2023-10-02 RX ORDER — LEVOFLOXACIN 750 MG/1
750 TABLET ORAL DAILY
Qty: 3 TABLET | Refills: 0 | Status: SHIPPED | OUTPATIENT
Start: 2023-10-03 | End: 2023-10-06

## 2023-10-02 RX ORDER — ENOXAPARIN SODIUM 100 MG/ML
40 INJECTION SUBCUTANEOUS DAILY
Status: DISCONTINUED | OUTPATIENT
Start: 2023-10-02 | End: 2023-10-02 | Stop reason: HOSPADM

## 2023-10-02 RX ORDER — ENOXAPARIN SODIUM 100 MG/ML
30 INJECTION SUBCUTANEOUS DAILY
Status: DISCONTINUED | OUTPATIENT
Start: 2023-10-02 | End: 2023-10-02 | Stop reason: DRUGHIGH

## 2023-10-02 RX ADMIN — MEMANTINE HYDROCHLORIDE 5 MG: 10 TABLET ORAL at 08:30

## 2023-10-02 RX ADMIN — LEVOFLOXACIN 750 MG: 500 TABLET, FILM COATED ORAL at 08:30

## 2023-10-02 RX ADMIN — ASPIRIN 81 MG CHEWABLE TABLET 81 MG: 81 TABLET CHEWABLE at 08:29

## 2023-10-02 RX ADMIN — ENOXAPARIN SODIUM 40 MG: 100 INJECTION SUBCUTANEOUS at 08:30

## 2023-10-02 RX ADMIN — METOPROLOL TARTRATE 25 MG: 25 TABLET, FILM COATED ORAL at 08:30

## 2023-10-02 RX ADMIN — Medication 1000 UNITS: at 08:29

## 2023-10-02 RX ADMIN — FUROSEMIDE 20 MG: 40 TABLET ORAL at 08:30

## 2023-10-02 RX ADMIN — SODIUM CHLORIDE, PRESERVATIVE FREE 10 ML: 5 INJECTION INTRAVENOUS at 08:30

## 2023-10-02 ASSESSMENT — PAIN SCALES - GENERAL
PAINLEVEL_OUTOF10: 0
PAINLEVEL_OUTOF10: 0

## 2023-10-02 NOTE — DISCHARGE INSTR - COC
(please select all that are sent with patient):  {CHP DME Belongings:697682255}    RN SIGNATURE:  Electronically signed by Lacey Mack LPN on  at 63:14 PM EDT    CASE MANAGEMENT/SOCIAL WORK SECTION    Inpatient Status Date: ***    Readmission Risk Assessment Score:  Readmission Risk              Risk of Unplanned Readmission:  23           Discharging to Facility/ Agency   Name:   Address:  Phone:  Fax:    Dialysis Facility (if applicable)   Name:  Address:  Dialysis Schedule:  Phone:  Fax:    / signature: {Esignature:[id number]}    PHYSICIAN SECTION    Prognosis: {Prognosis:1210612628}    Condition at Discharge: [address] Patient Condition:135078344}    Rehab Potential (if transferring to Rehab): {Prognosis:0321477638}    Recommended Labs or Other Treatments After Discharge: ***    Physician Certification: I certify the above information and transfer of Reji Beckwith  is necessary for the continuing treatment of the diagnosis listed and that she requires {Admit to Appropriate Level of Care:28954} for {GREATER/LESS:234094262} 30 days.      Update Admission H&P: {CHP DME Changes in [facility]}    PHYSICIAN SIGNATURE:  {Esignature:[id number]}

## 2023-10-02 NOTE — CARE COORDINATION
0825: Chart reviewed. Per notes; patient receiving IVF and IR consulted for T8 compression fracture. DCP assessment to be completed.     CM will continue to follow patient and recs of medical team.
1217 Transportation to arrive in 20 mins. Notified family at bedside of ETA.
DC summary, DC order, Labs, MAR, Rapid Covid test uploaded to [employer]. When medical transport available patient will go to [facility] at [address] [facility], [address] Patient is going to room 116P. The number to call report is [phone]. Family at bedside and notified of above information.
Transition of Care Plan:    RUR: 15%  Prior Level of Functioning: Requires assistance  Disposition: SNF  If SNF or IPR: Date FOC offered: 9/29/2023  Date FOC received: 9/29/2023  Accepting facility: [facility]  Date authorization started with reference number: N/A  Date authorization received and expires: N/A  Follow up appointments: N/A  DME needed: N/A  Transportation at discharge: Ambulance  IM/IMM Medicare/ letter given: Yes  Is patient a [place] and connected with [facility]? N/A   If yes, was [place] transfer form completed and VA notified? N/A  Caregiver Contact: Yes  Discharge Caregiver contacted prior to discharge? Yes  Care Conference needed?  N/A  Barriers to discharge:  N/A
been in contact with several [facility](s). Choice letter signed, placed on chart and referrals sent. CM also called [facility] and [facility] for [facility]och asking they contact Lidia Miranda to discuss further. Medications obtained from [facility] on the [place]. CM will continue to follow patient and recs of medical team.    Current Dispo: SNF vs [facility]    1136: [facility]. Patient will need a 3 night stay. [facility] for [employer] have reached out to the [address] [place] [address] for conversation. 1230: [facility] Liaison on unit visiting with patient and daughter. 1315: Per patient's daughter, Lidia Miranda she would like patient to discharge to [facility].    1400: Lidia Miranda called CM stating she and family NOW want [facility] as first choice. CM explained [facility] has not accepted patient at this time, but has received referral.    CM messaged [facility] asking if they are able to accept patient. 1442: Per [facility], \"Yes, we are able to accept. She will need a negative COVID test taken within 24 hours of discharge. \"    [facility] is requesting a [facility].    1628: CM informed [facility] via phone and [facility] of change in first choice. CM notified patient's daughter, Lidia Miranda of [facility] acceptance. 1657: [facility] completed and attached in [address] PHILLIPS notified of need for signature.

## 2023-10-02 NOTE — DISCHARGE SUMMARY
NAMENDA     metoprolol tartrate 25 MG tablet  Commonly known as: LOPRESSOR     vitamin D 25 MCG (1000 UT) Tabs tablet  Commonly known as: CHOLECALCIFEROL            STOP taking these medications      METOPROLOL SUCCINATE PO               Where to Get Your Medications        Information about where to get these medications is not yet available    Ask your nurse or doctor about these medications  levoFLOXacin 750 MG tablet           Follow up Care:    1. Zena Love MD in 1-2 weeks. Follow-up Information    None         No follow-ups on file. Patient Follow Up Instructions: Activity: activity as tolerated  Diet:   Easy to Chew  Wound care:  None    Condition at Discharge:  Stable  __________________________________________________________________    Disposition  SNF/LTC  ____________________________________________________________________    Code Status: DNR/DNI  ___________________________________________________________________    Discharge Exam:  Patient seen and examined by me on discharge day. Pertinent Findings:  Gen:    Not in distress  Chest: Clear lungs  CVS:   Regular rhythm. No edema  Abd:  Soft, not distended, not tender  Neuro:  Alert    CONSULTATIONS: none    Significant Diagnostic Studies:   9/28/2023: BUN 20 mg/dL (Ref range: 6 - 20 mg/dL); CO2 26 mmol/L (Ref range: 21 - 32 mmol/L); Hematocrit 36.3 % (Ref range: 35.0 - 47.0 %); Hemoglobin 11.6 g/dL (Ref range: 11.5 - 16.0 g/dL); Potassium Hemolyzed, Recollection Recommended mmol/L (Ref range: 3.5 - 5.1 mmol/L); Sodium 139 mmol/L (Ref range: 136 - 145 mmol/L)  9/29/2023: BUN 14 mg/dL (Ref range: 6 - 20 mg/dL); CO2 22 mmol/L (Ref range: 21 - 32 mmol/L); Hematocrit 35.8 % (Ref range: 35.0 - 47.0 %); Hemoglobin 10.9 g/dL (L; Ref range: 11.5 - 16.0 g/dL); Potassium 3.1 mmol/L (L; Ref range: 3.5 - 5.1 mmol/L);  Sodium 140 mmol/L (Ref range: 136 - 145 mmol/L)  Recent Labs     10/01/23  0912 10/02/23  0511   WBC 8.1 8.0   HGB 10.6* 10.9*

## 2023-10-04 ENCOUNTER — OUTSIDE SERVICES (OUTPATIENT)
Age: 88
End: 2023-10-04
Payer: MEDICARE

## 2023-10-04 DIAGNOSIS — Z87.891 HISTORY OF SMOKING: ICD-10-CM

## 2023-10-04 DIAGNOSIS — I50.9 CONGESTIVE HEART FAILURE, UNSPECIFIED HF CHRONICITY, UNSPECIFIED HEART FAILURE TYPE (HCC): Primary | ICD-10-CM

## 2023-10-04 DIAGNOSIS — Z66 DNR (DO NOT RESUSCITATE): ICD-10-CM

## 2023-10-04 DIAGNOSIS — R05.9 COUGH, UNSPECIFIED TYPE: ICD-10-CM

## 2023-10-04 DIAGNOSIS — U07.1 COVID: ICD-10-CM

## 2023-10-04 DIAGNOSIS — I10 ESSENTIAL (PRIMARY) HYPERTENSION: ICD-10-CM

## 2023-10-04 DIAGNOSIS — F03.90 DEMENTIA, UNSPECIFIED DEMENTIA SEVERITY, UNSPECIFIED DEMENTIA TYPE, UNSPECIFIED WHETHER BEHAVIORAL, PSYCHOTIC, OR MOOD DISTURBANCE OR ANXIETY (HCC): ICD-10-CM

## 2023-10-04 PROCEDURE — 99305 1ST NF CARE MODERATE MDM 35: CPT

## 2023-10-04 ASSESSMENT — ENCOUNTER SYMPTOMS
EYE REDNESS: 0
SHORTNESS OF BREATH: 0
CONSTIPATION: 0
COUGH: 1
WHEEZING: 0
PHOTOPHOBIA: 0
SORE THROAT: 0
DIARRHEA: 0

## 2023-10-04 NOTE — PROGRESS NOTES
Winda Runner  80 y.o. female  6/13/1928  [address] [address]  [id number]   [facility] at [address] History and Physical  Elana Cervantes DO       Encounter Date: 10/4/2023  Admission Date: 10/2/2023    PCP: Ana Brar MD  Responsible Party: Dr. Kadeem Donovan MD    No chief complaint on file. History of Present Illness   Winda Runner is a 80 y.o. female who was seen at [facility] today for admission. Was recently hospitalized with new onset CHF exacerbation, diuresed, treated for UTI, and discharged to [facility]. They are admitted for long-term care. Patient present with daughter, son, and daughter in law at time of encounter, who help with history. Daughter, Oskar Boss, is POA. Pt lived with Oskar Boss up until time of recent hospital admission for [facility]. Oskar Boss reports the patient has significant functional decline over the past month or so and has even verbalized that she feels like she is in the process of dying. Patient is pleasant during our encounter today and denies any specific concerns. Functional Status/Activity   Functional Status: Maximum assistance    ADL Assessment (1-Independent, 2-Adaptive Assist, 3-Human Assist 4-Total Dependent)   1 2 3 4   Bath/Shower [] [] [x] []   Shaving [] [] [x] []   Hairdressing [] [] [x] []   Dressing [] [] [x] []   Eating [] [x] [] []   Transfer bed to chair [] [] [x] []   Toilet use [] [] [x] []     Activity: Fall precautions, assist with movement    Continence:   Bladder  [] Yes  [x] No    Bowel  [] Yes  [x] No      Diet: Regular, thickened liquids    Review of Systems   Review of Systems   Constitutional:  Negative for fatigue and fever. HENT:  Negative for congestion and sore throat. Eyes:  Negative for photophobia and redness. Respiratory:  Positive for cough. Negative for shortness of breath and wheezing. Cardiovascular:  Negative for chest pain and leg swelling.    Gastrointestinal:

## 2023-10-06 VITALS
SYSTOLIC BLOOD PRESSURE: 131 MMHG | HEIGHT: 62 IN | RESPIRATION RATE: 17 BRPM | BODY MASS INDEX: 22.61 KG/M2 | DIASTOLIC BLOOD PRESSURE: 88 MMHG | HEART RATE: 76 BPM | TEMPERATURE: 97.9 F | OXYGEN SATURATION: 97 %

## 2023-10-06 PROBLEM — F03.90 DEMENTIA (HCC): Status: ACTIVE | Noted: 2023-10-06

## 2023-10-06 PROBLEM — I50.9 CONGESTIVE HEART FAILURE (HCC): Status: ACTIVE | Noted: 2023-10-06

## 2023-10-06 PROBLEM — U07.1 COVID: Status: ACTIVE | Noted: 2023-10-06

## 2023-10-06 PROBLEM — I10 ESSENTIAL (PRIMARY) HYPERTENSION: Status: ACTIVE | Noted: 2023-10-06

## 2023-10-28 PROBLEM — E86.0 DEHYDRATION: Status: RESOLVED | Noted: 2023-09-28 | Resolved: 2023-10-28

## 2023-10-28 PROBLEM — N39.0 UTI (URINARY TRACT INFECTION): Status: RESOLVED | Noted: 2023-09-28 | Resolved: 2023-10-28

## 2023-11-02 ENCOUNTER — OUTSIDE SERVICES (OUTPATIENT)
Age: 88
End: 2023-11-02

## 2023-11-02 VITALS
OXYGEN SATURATION: 93 % | DIASTOLIC BLOOD PRESSURE: 69 MMHG | SYSTOLIC BLOOD PRESSURE: 100 MMHG | HEART RATE: 85 BPM | BODY MASS INDEX: 23.27 KG/M2 | WEIGHT: 127.2 LBS | TEMPERATURE: 97.8 F

## 2023-11-02 DIAGNOSIS — D69.2 SENILE PURPURA (HCC): ICD-10-CM

## 2023-11-02 DIAGNOSIS — I50.9 CONGESTIVE HEART FAILURE, UNSPECIFIED HF CHRONICITY, UNSPECIFIED HEART FAILURE TYPE (HCC): Primary | ICD-10-CM

## 2023-11-02 DIAGNOSIS — I10 ESSENTIAL (PRIMARY) HYPERTENSION: ICD-10-CM

## 2023-11-02 DIAGNOSIS — F03.90 DEMENTIA, UNSPECIFIED DEMENTIA SEVERITY, UNSPECIFIED DEMENTIA TYPE, UNSPECIFIED WHETHER BEHAVIORAL, PSYCHOTIC, OR MOOD DISTURBANCE OR ANXIETY (HCC): ICD-10-CM

## 2023-11-02 ASSESSMENT — ENCOUNTER SYMPTOMS
SHORTNESS OF BREATH: 0
VOMITING: 0
WHEEZING: 0
ABDOMINAL PAIN: 0
SORE THROAT: 0

## 2023-11-02 NOTE — PROGRESS NOTES
Hypertension     Macular degeneration      Past Surgical History:   Procedure Laterality Date    CHOLECYSTECTOMY  1995    HAMMER TOE SURGERY  2000    HYSTERECTOMY (CERVIX STATUS UNKNOWN)  1964    ORTHOPEDIC SURGERY  1999    plate/7 screws in collar bone    OTHER SURGICAL HISTORY  1995 & 1996    heel spur    [place] UNLISTED PROCEDURE CARDIAC SURGERY      cabg 2006     Family History   Problem Relation Age of Onset    Cancer Daughter     Heart Disease Son     Heart Disease Sister     Cancer Son      Social History     Socioeconomic History    Marital status:       Spouse name: Not on file    Number of children: Not on file    Years of education: Not on file    Highest education level: Not on file   Occupational History    Not on file   Tobacco Use    Smoking status: Former    Smokeless tobacco: Never   Substance and Sexual Activity    Alcohol use: No    Drug use: Not on file    Sexual activity: Not on file   Other Topics Concern    Not on file   Social History Narrative    Not on file     Social Determinants of Health     Financial Resource Strain: Not on file   Food Insecurity: Not on file   Transportation Needs: Not on file   Physical Activity: Not on file   Stress: Not on file   Social Connections: Not on file   Intimate Partner Violence: Not on file   Housing Stability: Not on file            Current Medications/Allergies     Current Outpatient Medications   Medication Sig Dispense Refill    memantine (NAMENDA) 5 MG tablet Take 1 tablet by mouth 2 times daily      metoprolol tartrate (LOPRESSOR) 25 MG tablet Take 1 tablet by mouth 4 times daily      furosemide (LASIX) 20 MG tablet Take 1 tablet by mouth daily for 5 days 5 tablet 0    ASPIRIN 81 PO Take 81 mg by mouth      vitamin D (CHOLECALCIFEROL) 25 MCG (1000 UT) TABS tablet Take 25 mcg by mouth daily      albuterol sulfate HFA (PROVENTIL;VENTOLIN;PROAIR) 108 (90 Base) MCG/ACT inhaler Inhale 1-2 puffs into the lungs every 4 hours as needed       No current

## 2023-12-01 ENCOUNTER — OUTSIDE SERVICES (OUTPATIENT)
Age: 88
End: 2023-12-01

## 2023-12-01 VITALS
HEART RATE: 74 BPM | HEIGHT: 62 IN | SYSTOLIC BLOOD PRESSURE: 157 MMHG | TEMPERATURE: 97.6 F | OXYGEN SATURATION: 96 % | BODY MASS INDEX: 23.53 KG/M2 | WEIGHT: 127.9 LBS | DIASTOLIC BLOOD PRESSURE: 80 MMHG | RESPIRATION RATE: 16 BRPM

## 2023-12-01 DIAGNOSIS — F03.90 DEMENTIA, UNSPECIFIED DEMENTIA SEVERITY, UNSPECIFIED DEMENTIA TYPE, UNSPECIFIED WHETHER BEHAVIORAL, PSYCHOTIC, OR MOOD DISTURBANCE OR ANXIETY (HCC): ICD-10-CM

## 2023-12-01 DIAGNOSIS — N30.00 ACUTE CYSTITIS WITHOUT HEMATURIA: ICD-10-CM

## 2023-12-01 DIAGNOSIS — I50.9 CONGESTIVE HEART FAILURE, UNSPECIFIED HF CHRONICITY, UNSPECIFIED HEART FAILURE TYPE (HCC): Primary | ICD-10-CM

## 2023-12-01 DIAGNOSIS — R05.9 COUGH, UNSPECIFIED TYPE: ICD-10-CM

## 2023-12-01 DIAGNOSIS — D69.2 SENILE PURPURA (HCC): ICD-10-CM

## 2023-12-01 DIAGNOSIS — I10 ESSENTIAL (PRIMARY) HYPERTENSION: ICD-10-CM

## 2023-12-01 ASSESSMENT — ENCOUNTER SYMPTOMS
PHOTOPHOBIA: 0
SHORTNESS OF BREATH: 0
ABDOMINAL PAIN: 0
CHOKING: 0
BACK PAIN: 0

## 2023-12-02 NOTE — PROGRESS NOTES
I saw and evaluated the patient, performing the key elements of the service. I discussed the findings, assessment and plan with the resident and agree with the resident's findings and plan as documented in the resident's note. Patient notes development of acute onset left lower leg bruising with region of blister formation on distal lateral lower leg. Denies trauma to the region. Stopping aspirin.   Getting x-ray of left tib-fib
and few bacteria. Final urine culture growing 50k colonies of three or more mixed organisms. Symptoms resolved. Of note, no adverse reaction noted to macrobid treatment although previously listed as allergy in the chart, spoke with daughter who says she is not allergic to this medication and has tolerated it well in the past. Subsequently removed from allergy list.     CODE STATUS:   Code Status: DNR    I have discussed the assessment and plan with the patient and their responsible party as seen in the above orders. They have expressed understanding. No follow-ups on file. They patients care was discussed with the attending: Dr. Gordy Maxwell. Electronically Signed: Lisbet Ignacio MD     History   Patients past medical, surgical and family histories were reviewed and updated. Past Medical History:   Diagnosis Date    Dementia ([address])     Hypercholesterolemia     Hypertension     Macular degeneration      Past Surgical History:   Procedure Laterality Date    CHOLECYSTECTOMY  1995    HAMMER TOE SURGERY  2000    HYSTERECTOMY (CERVIX STATUS UNKNOWN)  1964    ORTHOPEDIC SURGERY  1999    plate/7 screws in collar bone    OTHER SURGICAL HISTORY  1995 & 1996    heel spur    [place] UNLISTED PROCEDURE CARDIAC SURGERY      cabg 2006     Family History   Problem Relation Age of Onset    Cancer Daughter     Heart Disease Son     Heart Disease Sister     Cancer Son      Social History     Socioeconomic History    Marital status:       Spouse name: Not on file    Number of children: Not on file    Years of education: Not on file    Highest education level: Not on file   Occupational History    Not on file   Tobacco Use    Smoking status: Former    Smokeless tobacco: Never   Substance and Sexual Activity    Alcohol use: No    Drug use: Not on file    Sexual activity: Not on file   Other Topics Concern    Not on file   Social History Narrative    Not on file     Social Determinants of Health     Financial Resource

## 2023-12-26 ENCOUNTER — OUTSIDE SERVICES (OUTPATIENT)
Age: 88
End: 2023-12-26

## 2023-12-26 VITALS
TEMPERATURE: 97.2 F | BODY MASS INDEX: 22.99 KG/M2 | HEART RATE: 76 BPM | RESPIRATION RATE: 18 BRPM | WEIGHT: 125.7 LBS | SYSTOLIC BLOOD PRESSURE: 152 MMHG | DIASTOLIC BLOOD PRESSURE: 84 MMHG | OXYGEN SATURATION: 96 %

## 2023-12-26 DIAGNOSIS — R05.9 COUGH, UNSPECIFIED TYPE: ICD-10-CM

## 2023-12-26 DIAGNOSIS — D69.2 SENILE PURPURA (HCC): ICD-10-CM

## 2023-12-26 DIAGNOSIS — I10 ESSENTIAL (PRIMARY) HYPERTENSION: ICD-10-CM

## 2023-12-26 DIAGNOSIS — I50.9 CONGESTIVE HEART FAILURE, UNSPECIFIED HF CHRONICITY, UNSPECIFIED HEART FAILURE TYPE (HCC): Primary | ICD-10-CM

## 2023-12-26 DIAGNOSIS — F03.90 DEMENTIA, UNSPECIFIED DEMENTIA SEVERITY, UNSPECIFIED DEMENTIA TYPE, UNSPECIFIED WHETHER BEHAVIORAL, PSYCHOTIC, OR MOOD DISTURBANCE OR ANXIETY (HCC): ICD-10-CM

## 2023-12-26 RX ORDER — DONEPEZIL HYDROCHLORIDE 5 MG/1
5 TABLET, FILM COATED ORAL NIGHTLY
COMMUNITY

## 2024-01-01 ENCOUNTER — OUTSIDE SERVICES (OUTPATIENT)
Age: 89
End: 2024-01-01

## 2024-01-02 NOTE — PROGRESS NOTES
Received call from nurse stating patient c/o diarrhea after dinner tonight. No abdominal pain, blood in stool, or any other complaints. Denies sick contacts. Requested immodium for pt. Discussed it is better to wait at this juncture in case of infectious cause vs likely to resolve on its own. All questions answered.    Flip Lao MD

## 2024-01-08 ENCOUNTER — TELEPHONE (OUTPATIENT)
Facility: CLINIC | Age: 89
End: 2024-01-08

## 2024-01-18 ENCOUNTER — CLINICAL DOCUMENTATION (OUTPATIENT)
Age: 89
End: 2024-01-18

## 2024-01-18 ENCOUNTER — TRANSCRIBE ORDERS (OUTPATIENT)
Facility: HOSPITAL | Age: 89
End: 2024-01-18

## 2024-01-18 DIAGNOSIS — I82.462 ACUTE DEEP VEIN THROMBOSIS (DVT) OF CALF MUSCLE VEIN OF LEFT LOWER EXTREMITY (HCC): Primary | ICD-10-CM

## 2024-01-19 ENCOUNTER — HOSPITAL ENCOUNTER (OUTPATIENT)
Facility: HOSPITAL | Age: 89
End: 2024-01-19
Attending: INTERNAL MEDICINE
Payer: MEDICARE

## 2024-01-19 DIAGNOSIS — I82.462 ACUTE DEEP VEIN THROMBOSIS (DVT) OF CALF MUSCLE VEIN OF LEFT LOWER EXTREMITY (HCC): ICD-10-CM

## 2024-01-19 PROCEDURE — 93970 EXTREMITY STUDY: CPT

## 2024-02-09 ENCOUNTER — OUTSIDE SERVICES (OUTPATIENT)
Age: 89
End: 2024-02-09

## 2024-02-09 DIAGNOSIS — R60.0 BILATERAL LOWER EXTREMITY EDEMA: Primary | ICD-10-CM

## 2024-02-09 DIAGNOSIS — M17.10 PRIMARY OSTEOARTHRITIS OF KNEE, UNSPECIFIED LATERALITY: ICD-10-CM

## 2024-02-09 RX ORDER — ACETAMINOPHEN 325 MG/1
650 TABLET ORAL EVERY 6 HOURS PRN
COMMUNITY
Start: 2024-02-09

## 2024-02-09 RX ORDER — FUROSEMIDE 20 MG/1
10 TABLET ORAL DAILY
COMMUNITY
Start: 2024-02-09

## 2024-02-09 ASSESSMENT — ENCOUNTER SYMPTOMS: SHORTNESS OF BREATH: 0

## 2024-02-09 NOTE — PROGRESS NOTES
Mindi Lauren  89 y.o. female  1928  [address] [facility] [address]  [id number]   [facility]  Acute Visit Progress Note  Brian Wills MD       Encounter Date: 2024    Chief Complaint   Patient presents with    Leg Swelling     History of Present Illness   Mindi Lauren is a 89 y.o. female who was seen at [facility] today for an acute concern.  Family was visiting and noticed increased right sided lower extremity edema.  This is worse then normal.  Had been having edema of the RLE w/ healing skin ulceration.  No known changes in salt intake.      Saw her cardiologist on  and no changes were made at that time.   They did venous duplex of both legs, for which the family reports as being normal.   Recommended continuation of ASA at that time.   Review of Systems   Review of Systems   Respiratory:  Negative for shortness of breath.    Cardiovascular:  Positive for leg swelling.         Vitals/Objective:     Physical Exam  Constitutional:       General: She is not in acute distress.  Musculoskeletal:      Right lower le+ Edema present.      Left lower leg: 3+ Edema (Up to knee) present.   Skin:     Capillary Refill: Capillary refill takes less than 2 seconds.      Comments: Left lower leg bandaged (clean and dry).  RLE w/ early blisters 2/2 edema.  None ruptured.    Neurological:      Mental Status: She is alert.       Assessment and Plan:   1. Bilateral lower extremity edema  Possible worsening of CHF.  Will trial 10mg lasix PO daily for the next 3 days.  Will re-evaluate edema at that time.   - furosemide (LASIX) 20 MG tablet; Take 0.5 tablets by mouth daily    2. Primary osteoarthritis of knee, unspecified laterality  Notes regular arthritic pain.  Does not remember to request pain medication.  Will schedule tylenol.  Re-evaluate and update as needed.   - acetaminophen (TYLENOL) 325 MG tablet; Take 2 tablets by mouth every 6 hours as needed

## 2024-02-14 ENCOUNTER — CLINICAL DOCUMENTATION (OUTPATIENT)
Age: 89
End: 2024-02-14

## 2024-02-14 NOTE — PROGRESS NOTES
Re-evaluated lower extremity swelling following x3 days of lasix. 2+ edema to foot and distal lower extremity. 1+ edema to RLE/foot. Improved per review of prior exam.    Patient denies calf pain, SOB.    Will continue to monitor.    Brian Waggoner MD

## 2024-02-20 ENCOUNTER — OUTSIDE SERVICES (OUTPATIENT)
Age: 89
End: 2024-02-20

## 2024-02-20 VITALS
RESPIRATION RATE: 18 BRPM | BODY MASS INDEX: 23.3 KG/M2 | WEIGHT: 126.6 LBS | HEIGHT: 62 IN | TEMPERATURE: 98 F | SYSTOLIC BLOOD PRESSURE: 132 MMHG | DIASTOLIC BLOOD PRESSURE: 74 MMHG | HEART RATE: 80 BPM | OXYGEN SATURATION: 97 %

## 2024-02-20 DIAGNOSIS — D69.2 SENILE PURPURA (HCC): ICD-10-CM

## 2024-02-20 DIAGNOSIS — I50.9 CONGESTIVE HEART FAILURE, UNSPECIFIED HF CHRONICITY, UNSPECIFIED HEART FAILURE TYPE (HCC): Primary | ICD-10-CM

## 2024-02-20 DIAGNOSIS — F03.90 DEMENTIA, UNSPECIFIED DEMENTIA SEVERITY, UNSPECIFIED DEMENTIA TYPE, UNSPECIFIED WHETHER BEHAVIORAL, PSYCHOTIC, OR MOOD DISTURBANCE OR ANXIETY (HCC): ICD-10-CM

## 2024-02-20 DIAGNOSIS — R05.9 COUGH, UNSPECIFIED TYPE: ICD-10-CM

## 2024-02-20 DIAGNOSIS — I10 ESSENTIAL (PRIMARY) HYPERTENSION: ICD-10-CM

## 2024-02-23 NOTE — PROGRESS NOTES
[facility] Family Medicine Residency Attending Attestation Note:  I was NOT present with the resident during the initial interview & examination of the patient. I personally interviewed the patient & repeated the critical or key portions of the exam.   I agree with the resident's note including their history, physical exam and assessment/plan except with pertinent updates to the patients history, exam, assessment or plan are noted below.   Completed 3 day course of lasix. No longer taking daily.  Edema with some improvement.  Will use as needed based on symptoms.   
Never   Substance and Sexual Activity    Alcohol use: No    Drug use: Not on file    Sexual activity: Not on file   Other Topics Concern    Not on file   Social History Narrative    Not on file     Social Determinants of Health     Financial Resource Strain: Not on file   Food Insecurity: Not on file   Transportation Needs: Not on file   Physical Activity: Not on file   Stress: Not on file   Social Connections: Not on file   Intimate Partner Violence: Not on file   Housing Stability: Not on file            Current Medications/Allergies     Current Outpatient Medications   Medication Sig Dispense Refill    acetaminophen (TYLENOL) 325 MG tablet Take 2 tablets by mouth every 6 hours as needed for Pain      furosemide (LASIX) 20 MG tablet Take 0.5 tablets by mouth daily      donepezil (ARICEPT) 5 MG tablet Take 1 tablet by mouth nightly      memantine (NAMENDA) 5 MG tablet Take 1 tablet by mouth 2 times daily      metoprolol tartrate (LOPRESSOR) 25 MG tablet Take 1 tablet by mouth 4 times daily      vitamin D (CHOLECALCIFEROL) 25 MCG (1000 UT) TABS tablet Take 25 mcg by mouth daily      albuterol sulfate HFA (PROVENTIL;VENTOLIN;PROAIR) 108 (90 Base) MCG/ACT inhaler Inhale 1-2 puffs into the lungs every 4 hours as needed       No current facility-administered medications for this visit.     Allergies   Allergen Reactions    Cephalexin      Other reaction(s): Unknown (comments)    Meclizine      Other reaction(s): Unknown (comments)    Metronidazole      Other reaction(s): Unknown (comments)    Norfloxacin      Other reaction(s): Unknown (comments)    Statins Other (See Comments)     Reaction Type: Allergy; Reaction(s): Altered mental status  Reaction Type: Allergy; Reaction(s): Altered mental status      Sulfa Antibiotics      Other reaction(s): Unknown (comments)    Tramadol      Other reaction(s): Unknown (comments)    Trimethoprim      Other reaction(s): Unknown (comments)    Oxycodone-Acetaminophen Rash

## 2024-03-04 RX ORDER — ASPIRIN 81 MG/1
81 TABLET ORAL DAILY
Qty: 90 TABLET | Refills: 0
Start: 2024-03-04

## 2024-03-28 ENCOUNTER — TELEPHONE (OUTPATIENT)
Age: 89
End: 2024-03-28

## 2024-03-28 NOTE — TELEPHONE ENCOUNTER
Called daughter to address concerns from nursing (daughter worried about R knee pain). [facility]. Usually gets them every 3-6 months. Last one in November 2023 in R knee. Would like staff to arrange appt for her mom and transportation. Order placed, relayed message to staff to arrange appt with Dr. Polk (would be okay to see AVA Willis at his [facility] office).    Amber Mcgee,   PGY-3 Resident  [facility]

## 2024-04-03 ENCOUNTER — CLINICAL DOCUMENTATION (OUTPATIENT)
Age: 89
End: 2024-04-03

## 2024-04-03 RX ORDER — POLYETHYLENE GLYCOL 3350 17 G/17G
17 POWDER, FOR SOLUTION ORAL DAILY PRN
COMMUNITY

## 2024-04-03 RX ORDER — LANOLIN ALCOHOL/MO/W.PET/CERES
3 CREAM (GRAM) TOPICAL NIGHTLY
COMMUNITY

## 2024-04-03 RX ORDER — SODIUM HYPOCHLORITE 1.25 MG/ML
5 SOLUTION TOPICAL DAILY
COMMUNITY

## 2024-04-03 RX ORDER — BENZONATATE 100 MG/1
100 CAPSULE ORAL 3 TIMES DAILY PRN
COMMUNITY

## 2024-04-05 ENCOUNTER — CLINICAL DOCUMENTATION (OUTPATIENT)
Age: 89
End: 2024-04-05

## 2024-04-05 DIAGNOSIS — M17.10 PRIMARY OSTEOARTHRITIS OF KNEE, UNSPECIFIED LATERALITY: Primary | ICD-10-CM

## 2024-04-18 ENCOUNTER — OUTSIDE SERVICES (OUTPATIENT)
Age: 89
End: 2024-04-18

## 2024-04-18 VITALS
HEART RATE: 78 BPM | BODY MASS INDEX: 22.52 KG/M2 | HEIGHT: 62 IN | DIASTOLIC BLOOD PRESSURE: 57 MMHG | TEMPERATURE: 98.1 F | OXYGEN SATURATION: 96 % | SYSTOLIC BLOOD PRESSURE: 107 MMHG | WEIGHT: 122.4 LBS | RESPIRATION RATE: 18 BRPM

## 2024-04-18 DIAGNOSIS — F03.90 DEMENTIA, UNSPECIFIED DEMENTIA SEVERITY, UNSPECIFIED DEMENTIA TYPE, UNSPECIFIED WHETHER BEHAVIORAL, PSYCHOTIC, OR MOOD DISTURBANCE OR ANXIETY (HCC): ICD-10-CM

## 2024-04-18 DIAGNOSIS — I10 ESSENTIAL (PRIMARY) HYPERTENSION: ICD-10-CM

## 2024-04-18 DIAGNOSIS — L98.9 LESION OF LOWER EXTREMITY: ICD-10-CM

## 2024-04-18 DIAGNOSIS — R60.0 BILATERAL LOWER EXTREMITY EDEMA: ICD-10-CM

## 2024-04-18 DIAGNOSIS — I50.9 CONGESTIVE HEART FAILURE, UNSPECIFIED HF CHRONICITY, UNSPECIFIED HEART FAILURE TYPE (HCC): Primary | ICD-10-CM

## 2024-04-18 NOTE — PROGRESS NOTES
donepezil (ARICEPT) 5 MG tablet Take 1 tablet by mouth nightly      memantine (NAMENDA) 5 MG tablet Take 1 tablet by mouth 2 times daily      metoprolol tartrate (LOPRESSOR) 25 MG tablet Take 1 tablet by mouth 4 times daily      vitamin D (CHOLECALCIFEROL) 25 MCG (1000 UT) TABS tablet Take 25 mcg by mouth daily      albuterol sulfate HFA (PROVENTIL;VENTOLIN;PROAIR) 108 (90 Base) MCG/ACT inhaler Inhale 1-2 puffs into the lungs every 4 hours as needed       No current facility-administered medications for this visit.     Allergies   Allergen Reactions    Cephalexin      Other reaction(s): Unknown (comments)    Meclizine      Other reaction(s): Unknown (comments)    Metronidazole      Other reaction(s): Unknown (comments)    Norfloxacin      Other reaction(s): Unknown (comments)    Statins Other (See Comments)     Reaction Type: Allergy; Reaction(s): Altered mental status  Reaction Type: Allergy; Reaction(s): Altered mental status      Sulfa Antibiotics      Other reaction(s): Unknown (comments)    Tramadol      Other reaction(s): Unknown (comments)    Trimethoprim      Other reaction(s): Unknown (comments)    Oxycodone-Acetaminophen Rash    Sulfamethoxazole-Trimethoprim      Other reaction(s): Unknown  Reaction Type: Allergy  Other reaction(s): Unknown (comments)  Other reaction(s): Unknown (comments)

## 2024-06-02 ENCOUNTER — TELEPHONE (OUTPATIENT)
Age: 89
End: 2024-06-02

## 2024-06-02 ENCOUNTER — CLINICAL DOCUMENTATION (OUTPATIENT)
Age: 89
End: 2024-06-02

## 2024-06-02 ENCOUNTER — APPOINTMENT (OUTPATIENT)
Facility: HOSPITAL | Age: 89
End: 2024-06-02
Payer: MEDICARE

## 2024-06-02 ENCOUNTER — HOSPITAL ENCOUNTER (EMERGENCY)
Facility: HOSPITAL | Age: 89
Discharge: HOME | End: 2024-06-02
Attending: EMERGENCY MEDICINE
Payer: MEDICARE

## 2024-06-02 VITALS
RESPIRATION RATE: 18 BRPM | WEIGHT: 133.3 LBS | SYSTOLIC BLOOD PRESSURE: 141 MMHG | HEIGHT: 62 IN | HEART RATE: 71 BPM | BODY MASS INDEX: 24.53 KG/M2 | DIASTOLIC BLOOD PRESSURE: 76 MMHG | TEMPERATURE: 98 F | OXYGEN SATURATION: 97 %

## 2024-06-02 DIAGNOSIS — R42 DIZZINESS: Primary | ICD-10-CM

## 2024-06-02 LAB
ALBUMIN SERPL-MCNC: 4.1 G/DL (ref 3.5–5.2)
ALBUMIN/GLOB SERPL: 1.7 (ref 1.1–2.2)
ALP SERPL-CCNC: 66 U/L (ref 35–104)
ALT SERPL-CCNC: 16 U/L (ref 10–35)
ANION GAP SERPL CALC-SCNC: 11 MMOL/L (ref 5–15)
APPEARANCE UR: CLEAR
AST SERPL-CCNC: 23 U/L (ref 10–35)
BACTERIA URNS QL MICRO: NEGATIVE /HPF
BASOPHILS # BLD: 0.1 K/UL (ref 0–1)
BASOPHILS NFR BLD: 1 % (ref 0–1)
BILIRUB SERPL-MCNC: 0.5 MG/DL (ref 0.2–1)
BILIRUB UR QL: NEGATIVE
BUN SERPL-MCNC: 26 MG/DL (ref 8–23)
BUN/CREAT SERPL: 32 (ref 12–20)
CALCIUM SERPL-MCNC: 9.6 MG/DL (ref 8.2–9.6)
CHLORIDE SERPL-SCNC: 103 MMOL/L (ref 98–107)
CO2 SERPL-SCNC: 26 MMOL/L (ref 22–29)
COLOR UR: ABNORMAL
CREAT SERPL-MCNC: 0.81 MG/DL (ref 0.5–0.9)
DIFFERENTIAL METHOD BLD: ABNORMAL
EOSINOPHIL # BLD: 0.1 K/UL (ref 0–0.4)
EOSINOPHIL NFR BLD: 2 %
EPITH CASTS URNS QL MICRO: ABNORMAL /LPF
ERYTHROCYTE [DISTWIDTH] IN BLOOD BY AUTOMATED COUNT: 13.9 % (ref 11.5–14.5)
GLOBULIN SER CALC-MCNC: 2.4 G/DL (ref 2–4)
GLUCOSE SERPL-MCNC: 112 MG/DL (ref 65–100)
GLUCOSE UR STRIP.AUTO-MCNC: NEGATIVE MG/DL
HCT VFR BLD AUTO: 37.5 % (ref 35–47)
HGB BLD-MCNC: 11.9 G/DL (ref 11.5–16)
HGB UR QL STRIP: ABNORMAL
IMM GRANULOCYTES # BLD AUTO: 0 K/UL (ref 0–0.04)
IMM GRANULOCYTES NFR BLD AUTO: 0 % (ref 0–0.5)
KETONES UR QL STRIP.AUTO: NEGATIVE MG/DL
LEUKOCYTE ESTERASE UR QL STRIP.AUTO: ABNORMAL
LYMPHOCYTES # BLD: 3.2 K/UL (ref 0.8–3.5)
LYMPHOCYTES NFR BLD: 44 % (ref 12–49)
MCH RBC QN AUTO: 27.8 PG (ref 26–34)
MCHC RBC AUTO-ENTMCNC: 31.7 G/DL (ref 30–36.5)
MCV RBC AUTO: 87.6 FL (ref 80–99)
MONOCYTES # BLD: 0.7 K/UL (ref 0–1)
MONOCYTES NFR BLD: 10 % (ref 5–13)
NEUTS SEG # BLD: 3.1 K/UL (ref 1.8–8)
NEUTS SEG NFR BLD: 43 % (ref 32–75)
NITRITE UR QL STRIP.AUTO: NEGATIVE
NRBC # BLD: 0 K/UL (ref 0–0.01)
NRBC BLD-RTO: 0 PER 100 WBC
PH UR STRIP: 6 (ref 5–8)
PLATELET # BLD AUTO: 240 K/UL (ref 150–400)
PMV BLD AUTO: 9.6 FL (ref 8.9–12.9)
POTASSIUM SERPL-SCNC: 4.5 MMOL/L (ref 3.5–5.1)
PROT SERPL-MCNC: 6.5 G/DL (ref 6.4–8.3)
PROT UR STRIP-MCNC: NEGATIVE MG/DL
RBC # BLD AUTO: 4.28 M/UL (ref 3.8–5.2)
RBC #/AREA URNS HPF: ABNORMAL /HPF
SODIUM SERPL-SCNC: 140 MMOL/L (ref 136–145)
SP GR UR REFRACTOMETRY: 1.01 (ref 1–1.03)
SPECIMEN HOLD: NORMAL
TROPONIN T SERPL HS-MCNC: 18.8 NG/L (ref 0–14)
TROPONIN T SERPL HS-MCNC: 19.8 NG/L (ref 0–14)
UROBILINOGEN UR QL STRIP.AUTO: 0.2 EU/DL (ref 0.2–1)
WBC # BLD AUTO: 7.2 K/UL (ref 3.6–11)
WBC URNS QL MICRO: ABNORMAL /HPF (ref 0–4)

## 2024-06-02 PROCEDURE — 36415 COLL VENOUS BLD VENIPUNCTURE: CPT

## 2024-06-02 PROCEDURE — 80053 COMPREHEN METABOLIC PANEL: CPT

## 2024-06-02 PROCEDURE — 99284 EMERGENCY DEPT VISIT MOD MDM: CPT

## 2024-06-02 PROCEDURE — 84484 ASSAY OF TROPONIN QUANT: CPT

## 2024-06-02 PROCEDURE — 85025 COMPLETE CBC W/AUTO DIFF WBC: CPT

## 2024-06-02 PROCEDURE — 70450 CT HEAD/BRAIN W/O DYE: CPT

## 2024-06-02 PROCEDURE — 81001 URINALYSIS AUTO W/SCOPE: CPT

## 2024-06-02 PROCEDURE — 93005 ELECTROCARDIOGRAM TRACING: CPT | Performed by: EMERGENCY MEDICINE

## 2024-06-02 ASSESSMENT — PAIN - FUNCTIONAL ASSESSMENT: PAIN_FUNCTIONAL_ASSESSMENT: NONE - DENIES PAIN

## 2024-06-02 NOTE — ED PROVIDER NOTES
[facility]  EMERGENCY DEPARTMENT ENCOUNTER      Pt Name: Mindi Lauren  MRN: [id number]  Birthdate 6/13/1928  Date of evaluation: 6/2/2024  Provider: Brian Chavira MD      HISTORY OF PRESENT ILLNESS      \[facility] [place] [facility]\""  89-year-old female with a past medical history of hypertension, hyperlipidemia, dementia, coronary artery disease presenting to the emergency department due to reports of dizziness and headache.  Most of the history is obtained from EMS as well as the patient's daughter.  Apparently last night patient developed a headache.  She went to bed and then at some point this morning she called her daughter and told her that she was dizzy.  The patient says \"I was dizzy has a loon\".  She is unable to state how long the dizziness lasted for her to describe exactly what it felt like, but states she is no longer dizzy.  Patient says she thinks maybe somebody poisoned her.  She denies a headache currently.  She denies chest pain.  Daughter reports no recent illnesses.  Patient is reportedly nonambulatory at baseline and can only stand to pivot.      Nursing Notes were reviewed.    REVIEW OF SYSTEMS         Review of Systems  All systems reviewed were negative unless otherwise documented in the HPI      PAST MEDICAL HISTORY     Past Medical History:   Diagnosis Date    Dementia (HCC)     Hypercholesterolemia     Hypertension     Macular degeneration          SURGICAL HISTORY       Past Surgical History:   Procedure Laterality Date    CHOLECYSTECTOMY  1995    HAMMER TOE SURGERY  2000    HYSTERECTOMY (CERVIX STATUS UNKNOWN)  1964    ORTHOPEDIC SURGERY  1999    plate/7 screws in collar bone    OTHER SURGICAL HISTORY  1995 & 1996    heel spur    [facility] UNLISTED PROCEDURE CARDIAC SURGERY      cabg 2006         CURRENT MEDICATIONS       Previous Medications    ACETAMINOPHEN (TYLENOL) 325 MG TABLET    Take 2 tablets by mouth every 6 hours as needed for Pain    ALBUTEROL SULFATE HFA (PROVENTIL;VENTOLIN;PROAIR) 108

## 2024-06-02 NOTE — ED NOTES
I went to call [employer] about the denial for the pts budesonide and iptratropium and to tell them that he cannot use and inhaler but I could not remember why you said he could not use one. Please advise. Notified Lynn Helm of patient's return with [facility] ETA of [phone]

## 2024-06-02 NOTE — ED TRIAGE NOTES
Pt reports to ED via EMS. Pt reports to ED with c/o of dizziness this AM. Pt denies headache and N/V/D. Pt AxO x1

## 2024-06-03 LAB
EKG ATRIAL RATE: 78 BPM
EKG DIAGNOSIS: NORMAL
EKG P AXIS: 47 DEGREES
EKG P-R INTERVAL: 310 MS
EKG Q-T INTERVAL: 414 MS
EKG QRS DURATION: 138 MS
EKG QTC CALCULATION (BAZETT): 471 MS
EKG R AXIS: -59 DEGREES
EKG T AXIS: 24 DEGREES
EKG VENTRICULAR RATE: 78 BPM

## 2024-06-03 PROCEDURE — 93010 ELECTROCARDIOGRAM REPORT: CPT | Performed by: SPECIALIST

## 2024-06-11 ENCOUNTER — CLINICAL DOCUMENTATION (OUTPATIENT)
Age: 89
End: 2024-06-11

## 2024-06-11 NOTE — PROGRESS NOTES
Reviewed lab results from 6/6/24:   - TSH within normal range at 0.99.  - Vitamin B12 within normal range at 345 (reference range 213-816)  - Vitamin D low-normal at 33.1 (reference range ). Continue 1000 units daily.   - Folate low at 3.3 (reference range <3.4 is low). Start supplement.     No anemia on CBC from ER visit on 6/2/24.     Renetta Smith, DO

## 2024-06-19 ENCOUNTER — OUTSIDE SERVICES (OUTPATIENT)
Age: 89
End: 2024-06-19

## 2024-06-19 VITALS
WEIGHT: 124.5 LBS | HEIGHT: 62 IN | OXYGEN SATURATION: 92 % | RESPIRATION RATE: 16 BRPM | HEART RATE: 80 BPM | DIASTOLIC BLOOD PRESSURE: 60 MMHG | SYSTOLIC BLOOD PRESSURE: 121 MMHG | BODY MASS INDEX: 22.91 KG/M2 | TEMPERATURE: 97.6 F

## 2024-06-19 DIAGNOSIS — D69.2 SENILE PURPURA (HCC): ICD-10-CM

## 2024-06-19 DIAGNOSIS — M17.10 PRIMARY OSTEOARTHRITIS OF KNEE, UNSPECIFIED LATERALITY: ICD-10-CM

## 2024-06-19 DIAGNOSIS — Z87.891 HISTORY OF SMOKING: ICD-10-CM

## 2024-06-19 DIAGNOSIS — I10 ESSENTIAL (PRIMARY) HYPERTENSION: ICD-10-CM

## 2024-06-19 DIAGNOSIS — Z66 DNR (DO NOT RESUSCITATE): ICD-10-CM

## 2024-06-19 DIAGNOSIS — E55.9 VITAMIN D DEFICIENCY: ICD-10-CM

## 2024-06-19 DIAGNOSIS — F03.90 DEMENTIA, UNSPECIFIED DEMENTIA SEVERITY, UNSPECIFIED DEMENTIA TYPE, UNSPECIFIED WHETHER BEHAVIORAL, PSYCHOTIC, OR MOOD DISTURBANCE OR ANXIETY (HCC): Primary | ICD-10-CM

## 2024-06-20 NOTE — PROGRESS NOTES
Mindi Lauren  89 y.o. female  6/13/1928  [address] [facility] [address]  [id number]   [facility] Progress Note  Jean Ponce MD       Encounter Date: 6/19/2024    Chief Complaint   Patient presents with    Follow-up     History of Present Illness   Mindi Lauren is a 89 y.o. female who was seen at [facility] today for their 60-day recertification. She was last seen 6/19/24      She was seen in the ED for dizziness and headache. Workup was unremarkable. Now asymptomatic. No new complaints    Diet: Regular diet, regular texture, thin consistency     Out of bed with assistance    Review of Systems   Review of Systems  Negative other than HPI      Vitals/Objective:     Vitals:    06/19/24 2323   BP: 121/60   Pulse: 80   Resp: 16   Temp: 97.6 °F (36.4 °C)   SpO2: 92%   Weight: 56.5 kg (124 lb 8 oz)   Height: 1.575 m (5' 2\")     Body mass index is 22.77 kg/m².    Weight is stable    Physical Exam  General: No acute distress. Alert. Cooperative.   Head: Normocephalic. Atraumatic.   Eyes:              Conjunctiva pink. Sclera white. PERRL.   Throat: Moist mucous membranes. No tonsillar exudates or erythema.    Neck: No JVD. No lymphadenopathy.   Respiratory: CTAB. No w/r/r/c. No accessory muscle use.   Cardiovascular: Regular Rhythm. No m/r/g.    GI: + bowel sounds. Nontender. No rebound/guarding. Non-distended.   Extremities: Minimal ankle LE edema. Distal pulses intact.    Musculoskeletal: Full ROM in all extremities.    Skin: Warm, dry. No rashes.    Neuro: Alert and oriented. No focal deficits. Strength 5/5 in all extremities.        Pertinent Lab/Test Results:  - TSH within normal range at 0.99.  - Vitamin B12 within normal range at 345 (reference range 213-816)  - Vitamin D low-normal at 33.1 (reference range ). Continue 1000 units daily.   Lab Results   Component Value Date    WBC 7.2 06/02/2024    HGB 11.9 06/02/2024    HCT 37.5

## 2024-06-21 NOTE — PROGRESS NOTES
[facility] Family Medicine Residency Attending Attestation Note:  I was NOT present with the resident during the initial interview & examination of the patient. I personally interviewed the patient & repeated the critical or key portions of the exam.   I agree with the resident's note including their history, physical exam and assessment/plan except with pertinent updates to the patients history, exam, assessment or plan are noted below.

## 2024-07-22 ENCOUNTER — TELEPHONE (OUTPATIENT)
Age: 89
End: 2024-07-22

## 2024-07-22 NOTE — TELEPHONE ENCOUNTER
PT daughter called to let us know pt's hearing aids are not working and is she needs to come in if she can just drop them off. Please advise

## 2024-07-23 ENCOUNTER — TELEPHONE (OUTPATIENT)
Age: 89
End: 2024-07-23

## 2024-07-23 NOTE — TELEPHONE ENCOUNTER
Called the patient's daughter, Brandt [phone]. The patient herself has dementia and lives in a nursing home.    The patient's daughter dropped off her hearing aids. The right one did not charge and the left one had a wax guard trapped deep inside the device. The devices were not able to be repaired in office today. Both devices need to be sent to [facility].    The patient's daughter was informed of this, and she was informed that, if [facility] is able to repair the devices, it will be $290 per device. If [facility] needs to replace them entirely, it will be $350 per device.    The patient was okay with these charges. She will be called when the devices arrive back from [facility], approximately two weeks.      UPS Tracking number [id number]

## 2024-08-05 ENCOUNTER — TELEPHONE (OUTPATIENT)
Age: 89
End: 2024-08-05

## 2024-08-05 NOTE — TELEPHONE ENCOUNTER
The patient's daughter, Cami, was called at [phone]. She was informed that her mother's hearing aids were received back from the . Both devices were able to be repaired and did not require total replacement. As a result, her total will be $580 ($290 per device) when she picks them up from the clinic. She was informed she would be able to pick them up at her convenience.    Cami stated she would likely  her devices on Wednesday, 8/7. She may also be able to come tomorrow, Tuesday, 8/6. She will bring a check for $580 when she arrives.

## 2024-08-19 ENCOUNTER — OUTSIDE SERVICES (OUTPATIENT)
Age: 89
End: 2024-08-19

## 2024-08-19 VITALS
HEART RATE: 67 BPM | SYSTOLIC BLOOD PRESSURE: 118 MMHG | BODY MASS INDEX: 23.22 KG/M2 | HEIGHT: 62 IN | TEMPERATURE: 97.7 F | RESPIRATION RATE: 18 BRPM | DIASTOLIC BLOOD PRESSURE: 64 MMHG | OXYGEN SATURATION: 95 % | WEIGHT: 126.2 LBS

## 2024-08-19 DIAGNOSIS — Z87.891 HISTORY OF SMOKING: ICD-10-CM

## 2024-08-19 DIAGNOSIS — Z66 DNR (DO NOT RESUSCITATE): ICD-10-CM

## 2024-08-19 DIAGNOSIS — M17.0 PRIMARY OSTEOARTHRITIS OF BOTH KNEES: ICD-10-CM

## 2024-08-19 DIAGNOSIS — E55.9 VITAMIN D DEFICIENCY: ICD-10-CM

## 2024-08-19 DIAGNOSIS — D69.2 SENILE PURPURA (HCC): ICD-10-CM

## 2024-08-19 DIAGNOSIS — F03.90 DEMENTIA, UNSPECIFIED DEMENTIA SEVERITY, UNSPECIFIED DEMENTIA TYPE, UNSPECIFIED WHETHER BEHAVIORAL, PSYCHOTIC, OR MOOD DISTURBANCE OR ANXIETY (HCC): Primary | ICD-10-CM

## 2024-08-19 DIAGNOSIS — I10 ESSENTIAL (PRIMARY) HYPERTENSION: ICD-10-CM

## 2024-08-19 DIAGNOSIS — I50.9 CHRONIC CONGESTIVE HEART FAILURE, UNSPECIFIED HEART FAILURE TYPE (HCC): ICD-10-CM

## 2024-08-20 NOTE — PROGRESS NOTES
Mindi Lauren  89 y.o. female  6/13/1928  [address] [address]  [id number]   [facility] Progress Note  Jean Ponce MD       Encounter Date:8/20/24    Chief Complaint   Patient presents with    Follow-up     History of Present Illness   Mindi Lauren is a 89 y.o. female who was seen at [facility] today for their 60-day recertification. She was last seen on 6/19/24      Diet: Regular diet, regular texture, thin consistency     Out of bed with assistance    Review of Systems   Review of Systems  Negative other than HPI       Vitals/Objective:     Vitals:    08/19/24 2159   BP: 118/64   Pulse: 67   Resp: 18   Temp: 97.7 °F (36.5 °C)   SpO2: 95%   Weight: 57.2 kg (126 lb 3.2 oz)   Height: 1.575 m (5' 2\")     Body mass index is 23.08 kg/m².    Weight is increasing steadily    Physical Exam  General: No acute distress. Alert. Cooperative.   Head: Normocephalic. Atraumatic.   Eyes:              Conjunctiva pink. Sclera white. PERRL.   Throat: Moist mucous membranes. No tonsillar exudates or erythema.    Neck: No JVD. No lymphadenopathy.   Respiratory: CTAB. No w/r/r/c. No accessory muscle use.   Cardiovascular: Regular Rhythm. No m/r/g.    GI: + bowel sounds. Nontender. No rebound/guarding. Non-distended.   Extremities: Mild ankle LE edema. Distal pulses intact. Nail no signs of ingrown nails erythema discharge onychomycosis   Musculoskeletal: Full ROM in all extremities.    Skin: Warm, dry. No rashes.    Neuro: Alert and oriented. No focal deficits.     Pertinent Lab/Test Results:  none    Assessment and Plan:   1. Dementia, unspecified dementia severity, unspecified dementia type, unspecified whether behavioral, psychotic, or mood disturbance or anxiety (HCC)  - Continue aricept 5mg qhs and memantine 5mg BID     2. CAD s/p CABG:F/w Dr Yfn Fernández at [facility].  Recent BMP wnl. Not volume overloaded on exam.    - Cont Metoprolol 25mg QID     3.

## 2024-10-10 NOTE — PROGRESS NOTES
Received nursing communication regarding redness on patient's right eyebrow.  Evaluated patient-appears to be small healing skin tear versus dry skin patch on right lateral brow.  Patient denying itching or pain.  No discharge or bleeding.  Will treat conservatively with Aquaphor/lotion for dry skin, continue to monitor for continued healing.  Alba Gomes MD

## 2024-10-12 ENCOUNTER — OUTSIDE SERVICES (OUTPATIENT)
Facility: HOSPITAL | Age: 89
End: 2024-10-12

## 2024-10-12 DIAGNOSIS — Z66 DNR (DO NOT RESUSCITATE): ICD-10-CM

## 2024-10-12 DIAGNOSIS — H91.93 BILATERAL HEARING LOSS, UNSPECIFIED HEARING LOSS TYPE: ICD-10-CM

## 2024-10-12 DIAGNOSIS — E55.9 VITAMIN D DEFICIENCY: ICD-10-CM

## 2024-10-12 DIAGNOSIS — M17.0 PRIMARY OSTEOARTHRITIS OF BOTH KNEES: ICD-10-CM

## 2024-10-12 DIAGNOSIS — F03.90 DEMENTIA, UNSPECIFIED DEMENTIA SEVERITY, UNSPECIFIED DEMENTIA TYPE, UNSPECIFIED WHETHER BEHAVIORAL, PSYCHOTIC, OR MOOD DISTURBANCE OR ANXIETY (HCC): Primary | ICD-10-CM

## 2024-10-12 DIAGNOSIS — Z87.891 HISTORY OF SMOKING: ICD-10-CM

## 2024-10-12 DIAGNOSIS — I25.810 CORONARY ARTERY DISEASE INVOLVING CORONARY BYPASS GRAFT WITHOUT ANGINA PECTORIS, UNSPECIFIED WHETHER NATIVE OR TRANSPLANTED HEART: ICD-10-CM

## 2024-10-12 DIAGNOSIS — D69.2 SENILE PURPURA (HCC): ICD-10-CM

## 2024-10-13 NOTE — PROGRESS NOTES
Mindi Lauren  89 y.o. female  6/13/1928  [address] [address]  [id number]   [facility] Progress Note  Jean Ponce MD       Encounter Date: 10/22/24    Chief Complaint   Patient presents with    Follow-up     History of Present Illness   Mindi Lauren is a 89 y.o. female who was seen at [facility] today for their 60-day recertification. She was last seen 8/22/24      Diet: Regular diet, regular texture, thin consistency     Out of bed with assistance    Review of Systems   Review of Systems negative other than HPI        Vitals/Objective:     Physical Exam  General: No acute distress. Alert. Cooperative.   Head: Normocephalic. Atraumatic.   Eyes:              Conjunctiva pink. Sclera white. PERRL.   Throat: Moist mucous membranes. No tonsillar exudates or erythema.    Neck: No JVD. No lymphadenopathy.   Respiratory: CTAB. No w/r/r/c. No accessory muscle use.   Cardiovascular: Regular Rhythm. No m/r/g.    GI: + bowel sounds. Nontender. No rebound/guarding. Non-distended.   Extremities: No LE edema. Distal pulses intact.    Musculoskeletal: Full ROM in all extremities.    Skin: Warm, dry. No rashes.    Neuro: Alert and oriented. No focal deficits. Strength 5/5 in all extremities.          Pertinent Lab/Test Results:  Reviewed lab results from 6/6/24:   - TSH within normal range at 0.99.  - Vitamin B12 within normal range at 345 (reference range 213-816)  - Vitamin D low-normal at 33.1 (reference range ). Continue 1000 units daily.   - Folate low at 3.3 (reference range <3.4 is low). Start supplement.      No anemia on CBC from ER visit on 6/2/24.     Assessment and Plan:   1. Dementia, unspecified dementia severity, unspecified dementia type, unspecified whether behavioral, psychotic, or mood disturbance or anxiety (HCC)  - Continue aricept 5mg qhs and memantine 5mg BID     2. CAD s/p CABG:F/w Dr Yfn Fernández at [facility].  Recent BMP

## 2024-10-24 PROBLEM — R41.82 ALTERED MENTAL STATUS: Status: RESOLVED | Noted: 2023-09-28 | Resolved: 2024-10-24

## 2024-10-24 PROBLEM — U07.1 COVID: Status: RESOLVED | Noted: 2023-10-06 | Resolved: 2024-10-24

## 2024-10-24 PROBLEM — I25.810 CORONARY ARTERY DISEASE INVOLVING CORONARY BYPASS GRAFT WITHOUT ANGINA PECTORIS: Status: ACTIVE | Noted: 2024-10-24

## 2024-10-24 PROBLEM — H91.93 BILATERAL HEARING LOSS: Status: ACTIVE | Noted: 2024-10-24

## 2024-10-25 DIAGNOSIS — F41.9 ANXIETY: Primary | ICD-10-CM

## 2024-10-25 RX ORDER — BUSPIRONE HYDROCHLORIDE 5 MG/1
5 TABLET ORAL 3 TIMES DAILY
Qty: 60 TABLET | Refills: 0 | Status: SHIPPED | OUTPATIENT
Start: 2024-10-25 | End: 2024-10-25

## 2024-10-25 NOTE — PROGRESS NOTES
[facility] Family Medicine Residency Attending Attestation Note:  I was NOT present with the resident during the initial interview & examination of the patient. I personally interviewed the patient & repeated the critical or key portions of the exam.   I agree with the resident's note including their history, physical exam and assessment/plan except with pertinent updates to the patients history, exam, assessment or plan are noted below.       Colon polyp

## 2024-10-25 NOTE — PROGRESS NOTES
Received call from nursing regarding patient's ongoing anxiety.  Stating having difficulties keeping her at meals as she tends to get anxious and stand up multiple times throughout the meal.  Requesting medication for anxiety for patient given difficulties.  Vital signs remained stable.  -Placing order for BuSpar 5 mg 3 times daily for anxiety, will continue to monitor for symptoms of anxiety and taper off as able.  Alba Gomes MD

## 2024-12-22 ENCOUNTER — OUTSIDE SERVICES (OUTPATIENT)
Age: 88
End: 2024-12-22

## 2024-12-22 VITALS
RESPIRATION RATE: 18 BRPM | HEART RATE: 70 BPM | WEIGHT: 129.9 LBS | DIASTOLIC BLOOD PRESSURE: 77 MMHG | SYSTOLIC BLOOD PRESSURE: 158 MMHG | BODY MASS INDEX: 23.76 KG/M2 | TEMPERATURE: 97.5 F | OXYGEN SATURATION: 97 %

## 2024-12-22 DIAGNOSIS — H93.91 LESION OF RIGHT EAR: ICD-10-CM

## 2024-12-22 DIAGNOSIS — I25.810 CORONARY ARTERY DISEASE INVOLVING CORONARY BYPASS GRAFT WITHOUT ANGINA PECTORIS, UNSPECIFIED WHETHER NATIVE OR TRANSPLANTED HEART: ICD-10-CM

## 2024-12-22 DIAGNOSIS — E55.9 VITAMIN D DEFICIENCY: ICD-10-CM

## 2024-12-22 DIAGNOSIS — Z87.891 HISTORY OF SMOKING: ICD-10-CM

## 2024-12-22 DIAGNOSIS — H90.0 CONDUCTIVE HEARING LOSS, BILATERAL: ICD-10-CM

## 2024-12-22 DIAGNOSIS — I10 ESSENTIAL (PRIMARY) HYPERTENSION: ICD-10-CM

## 2024-12-22 DIAGNOSIS — F03.90 DEMENTIA, UNSPECIFIED DEMENTIA SEVERITY, UNSPECIFIED DEMENTIA TYPE, UNSPECIFIED WHETHER BEHAVIORAL, PSYCHOTIC, OR MOOD DISTURBANCE OR ANXIETY (HCC): Primary | ICD-10-CM

## 2024-12-22 DIAGNOSIS — M17.0 PRIMARY OSTEOARTHRITIS OF BOTH KNEES: ICD-10-CM

## 2025-01-08 ENCOUNTER — TELEPHONE (OUTPATIENT)
Age: 89
End: 2025-01-08

## 2025-01-08 NOTE — TELEPHONE ENCOUNTER
Pt daughter called stating how much would it be to replace her left hearing aid. She stated the nursing home lost her hearing aid

## 2025-01-16 ENCOUNTER — TELEPHONE (OUTPATIENT)
Facility: HOSPITAL | Age: 89
End: 2025-01-16

## 2025-01-17 NOTE — TELEPHONE ENCOUNTER
Received call from Alexia Helm regarding this patient, spoke with Nurse Torres who reported patient's daughter visited today and noted a bruise of the dorsal right hand. On his discussion with patient she denied any pain or other symptoms. Last charted vitals are wnl (/77, HR 72, Temp 97.5, RR 18) and on his physical exam he reports small flat area of ecchymosis vs purpura with no warmth, skin defect/drainage fluctuance. She is still able to move the arm. Discussed that this is likely related to her hx of senile purpura and if she is asymptomatic we can continue to monitor until daytime physician is able to evaluate. He agreed and will call back with any new or worsening symptoms. Patient discussed with PGY3.     Narcisa Cotter MD   Family Medicine Resident

## 2025-02-06 ENCOUNTER — OUTSIDE SERVICES (OUTPATIENT)
Age: 89
End: 2025-02-06

## 2025-02-06 VITALS
TEMPERATURE: 97.4 F | BODY MASS INDEX: 24.67 KG/M2 | RESPIRATION RATE: 18 BRPM | HEART RATE: 74 BPM | DIASTOLIC BLOOD PRESSURE: 69 MMHG | WEIGHT: 134.9 LBS | SYSTOLIC BLOOD PRESSURE: 128 MMHG | OXYGEN SATURATION: 95 %

## 2025-02-06 DIAGNOSIS — D69.2 SENILE PURPURA (HCC): ICD-10-CM

## 2025-02-06 DIAGNOSIS — F03.90 DEMENTIA, UNSPECIFIED DEMENTIA SEVERITY, UNSPECIFIED DEMENTIA TYPE, UNSPECIFIED WHETHER BEHAVIORAL, PSYCHOTIC, OR MOOD DISTURBANCE OR ANXIETY (HCC): Primary | ICD-10-CM

## 2025-02-06 DIAGNOSIS — M17.0 PRIMARY OSTEOARTHRITIS OF BOTH KNEES: ICD-10-CM

## 2025-02-06 DIAGNOSIS — H90.0 CONDUCTIVE HEARING LOSS, BILATERAL: ICD-10-CM

## 2025-02-06 DIAGNOSIS — H93.91 LESION OF RIGHT EAR: ICD-10-CM

## 2025-02-06 DIAGNOSIS — I10 ESSENTIAL (PRIMARY) HYPERTENSION: ICD-10-CM

## 2025-02-06 DIAGNOSIS — I25.810 CORONARY ARTERY DISEASE INVOLVING CORONARY BYPASS GRAFT WITHOUT ANGINA PECTORIS, UNSPECIFIED WHETHER NATIVE OR TRANSPLANTED HEART: ICD-10-CM

## 2025-02-06 DIAGNOSIS — Z87.891 HISTORY OF SMOKING: ICD-10-CM

## 2025-02-06 DIAGNOSIS — E55.9 VITAMIN D DEFICIENCY: ICD-10-CM

## 2025-02-07 NOTE — PROGRESS NOTES
Mindi Lauren  89 y.o. female  6/13/1928  [address] [facility] [address]  [id number]   [facility] Progress Note  Sara Lopez MD       Encounter Date: 2/6/2025    PCP: Brian Wills MD  Responsible Party:   Cami Yao (Child)  [phone] (Home Phone)   Contact    No chief complaint on file.    History of Present Illness   Mindi Lauren is a 89 y.o. female who was seen at [facility] today for their 60-day recertification. She was last seen 12/22.      Interval updates: No new updates.     Diet: Regular diet, regular texture, thin consistency     Out of bed with assistance    Review of Systems   Review of Systems   All other systems reviewed and are negative.          Vitals/Objective:     Vitals:    02/06/25 2038   BP: 128/69   Pulse: 74   Resp: 18   Temp: 97.4 °F (36.3 °C)   SpO2: 95%   Weight: 61.2 kg (134 lb 14.4 oz)     Body mass index is 24.67 kg/m².    Weight is stable    Physical Exam  Constitutional:       Appearance: Normal appearance.   HENT:      Head: Normocephalic.      Ears:      Comments: Reduced hearing     Nose: Nose normal.      Mouth/Throat:      Mouth: Mucous membranes are moist.   Cardiovascular:      Rate and Rhythm: Normal rate and regular rhythm.      Pulses: Normal pulses.   Pulmonary:      Effort: Pulmonary effort is normal.      Breath sounds: Normal breath sounds.   Skin:     General: Skin is warm.      Capillary Refill: Capillary refill takes less than 2 seconds.   Neurological:      General: No focal deficit present.      Mental Status: She is alert. Mental status is at baseline.         Pertinent Lab/Test Results:  6/6/24:   - TSH within normal range at 0.99.  - Vitamin B12 within normal range at 345 (reference range 213-816)  - Vitamin D low-normal at 33.1 (reference range ).   - Folate low at 3.3 (reference range <3.4 is low).    No Patient Care Coordination Note on file.      Assessment and

## 2025-02-07 NOTE — PROGRESS NOTES
[facility] Family Medicine Residency Attending Attestation Note:  I was NOT present with the resident during the initial interview & examination of the patient. I personally interviewed the patient & repeated the critical or key portions of the exam.   I agree with the resident's note including their history, physical exam and assessment/plan except with pertinent updates to the patients history, exam, assessment or plan are noted below.      Has known OA of the knees.  We had previously offered corticosteroid injection with approval by family, however patient declined injection.  May consider revisiting in the future.

## 2025-05-02 ENCOUNTER — TELEPHONE (OUTPATIENT)
Age: 89
End: 2025-05-02

## 2025-05-30 ENCOUNTER — PROCEDURE VISIT (OUTPATIENT)
Age: 89
End: 2025-05-30

## 2025-05-30 DIAGNOSIS — H90.3 SENSORINEURAL HEARING LOSS (SNHL), BILATERAL: Primary | ICD-10-CM

## 2025-05-30 NOTE — PROGRESS NOTES
Mindi Lauren   1928, 89 y.o. female   [id number]     HEARING AID FITTING      RIGHT EAR:  /MODEL: Jordin Pimentel AI 12 ITE-R  SN: [id number]    LEFT EAR:   /MODEL: Jordin Pimentel AI 12 ITE-R  SN: [id number]     SN: [id number]  HAF: 2025  WARRANTY: 2027  L&D ELIGIBLE: Yes  TRIAL PERIOD ENDS: 2025    BUTTONS: DISABLED  PROGRAMS: DISABLED  PHONE CONNECTIVITY: DISABLED    Mindi Lauren's daughter was seen today,2025,  to  her mother's new hearing aids.  Hearing aids were programmed to the patient's most recent audiogram.    RECHARGEABLE Device orientation was completed, includin. Hearing aid insertion/removal   2. Buttons/Indicators   3. Rechargeable battery use and life expectancy     4.  insertion/removal     5. Cleaning/maintenance of the device     6. Loss & Damage/Repair warranty information   7. 30-day right-to-return period    It was recommended that patient return for a follow-up appointment within the 30-day right-to-return period for programming adjustments and education of the devices as warranted.      PATIENT EDUCATION:       Mindi Lauren may find additional product information in the provided hearing aid  device manual.      Unbundled Billing- see associated fees and codes below:    Description Code Amount   Hearing Aids  $1125   Dispensing Fee  $300             Total  $1425         Patient paid $1425.    Discussed with patient that any portion of the total cost that is not paid by insurance will be the patient's financial responsibility. Estimated insurance coverage is a verification of benefit and not a guarantee.     RECOMMENDATIONS:     Return for follow-up appointment within 30-day right-to-return period      Janna Martinez, CCC-A  Audiologist